# Patient Record
Sex: FEMALE | Race: ASIAN | NOT HISPANIC OR LATINO | URBAN - METROPOLITAN AREA
[De-identification: names, ages, dates, MRNs, and addresses within clinical notes are randomized per-mention and may not be internally consistent; named-entity substitution may affect disease eponyms.]

---

## 2018-04-18 ENCOUNTER — OUTPATIENT (OUTPATIENT)
Dept: OUTPATIENT SERVICES | Facility: HOSPITAL | Age: 68
LOS: 1 days | Discharge: HOME | End: 2018-04-18

## 2018-04-18 RX ORDER — CEPHALEXIN 500 MG
1 CAPSULE ORAL
Qty: 10 | Refills: 0
Start: 2018-04-18 | End: 2018-04-22

## 2018-04-18 RX ORDER — DOCUSATE SODIUM 100 MG
1 CAPSULE ORAL
Qty: 6 | Refills: 0
Start: 2018-04-18 | End: 2018-04-20

## 2019-10-07 ENCOUNTER — APPOINTMENT (OUTPATIENT)
Dept: CARDIOLOGY | Facility: CLINIC | Age: 69
End: 2019-10-07
Payer: MEDICARE

## 2019-10-07 VITALS
DIASTOLIC BLOOD PRESSURE: 70 MMHG | WEIGHT: 137 LBS | HEIGHT: 60 IN | TEMPERATURE: 98 F | SYSTOLIC BLOOD PRESSURE: 123 MMHG | OXYGEN SATURATION: 97 % | RESPIRATION RATE: 18 BRPM | HEART RATE: 78 BPM | BODY MASS INDEX: 26.9 KG/M2

## 2019-10-07 DIAGNOSIS — E78.5 HYPERLIPIDEMIA, UNSPECIFIED: ICD-10-CM

## 2019-10-07 DIAGNOSIS — I10 ESSENTIAL (PRIMARY) HYPERTENSION: ICD-10-CM

## 2019-10-07 PROCEDURE — 99204 OFFICE O/P NEW MOD 45 MIN: CPT

## 2019-10-07 PROCEDURE — 93000 ELECTROCARDIOGRAM COMPLETE: CPT

## 2019-10-07 RX ORDER — LOSARTAN POTASSIUM 100 MG/1
100 TABLET, FILM COATED ORAL
Refills: 0 | Status: ACTIVE | COMMUNITY
Start: 2019-10-07

## 2019-10-07 RX ORDER — SIMVASTATIN 20 MG/1
20 TABLET, FILM COATED ORAL
Refills: 0 | Status: ACTIVE | COMMUNITY
Start: 2019-10-07

## 2019-10-07 NOTE — DISCUSSION/SUMMARY
[FreeTextEntry1] : 68 F HTN hyperlipidemia with CP and SOB.\par Continue medications for HTN.\par Continue statin.\par CHECK ECHOCARDIOGRAM TO ASSESS LV FUNCTION.\par CHECK NST TO ASSESS PERFUSION (limited ET).\par Condition deteriorating, see me after tests.

## 2019-10-07 NOTE — HISTORY OF PRESENT ILLNESS
[FreeTextEntry1] : 1. HTN: on medications.\par 2. Hyperlipidemia: on statin.\par 3. SOB: patient reports severe exertional SOB over several months. SOB is severe, progressive, worsening over some time. She also had syncope one year ago. \par Patient also has had intermittent CP that is midline, substernal, without radiation, lasting minutes without relief. Symptoms are progressively worsening and occurring on a daily basis. \par She denies palpitations or dizziness.

## 2019-10-07 NOTE — PHYSICAL EXAM
[General Appearance - Well Developed] : well developed [Well Groomed] : well groomed [Normal Appearance] : normal appearance [No Deformities] : no deformities [General Appearance - In No Acute Distress] : no acute distress [General Appearance - Well Nourished] : well nourished [Eyelids - No Xanthelasma] : the eyelids demonstrated no xanthelasmas [Normal Conjunctiva] : the conjunctiva exhibited no abnormalities [No Oral Pallor] : no oral pallor [Normal Oral Mucosa] : normal oral mucosa [Normal Jugular Venous V Waves Present] : normal jugular venous V waves present [Normal Jugular Venous A Waves Present] : normal jugular venous A waves present [No Oral Cyanosis] : no oral cyanosis [No Jugular Venous Colunga A Waves] : no jugular venous colunga A waves [Heart Rate And Rhythm] : heart rate and rhythm were normal [Heart Sounds] : normal S1 and S2 [Respiration, Rhythm And Depth] : normal respiratory rhythm and effort [Auscultation Breath Sounds / Voice Sounds] : lungs were clear to auscultation bilaterally [Exaggerated Use Of Accessory Muscles For Inspiration] : no accessory muscle use [Abdomen Soft] : soft [Abdomen Tenderness] : non-tender [Nail Clubbing] : no clubbing of the fingernails [Abdomen Mass (___ Cm)] : no abdominal mass palpated [Petechial Hemorrhages (___cm)] : no petechial hemorrhages [Cyanosis, Localized] : no localized cyanosis [Skin Color & Pigmentation] : normal skin color and pigmentation [] : no rash [No Venous Stasis] : no venous stasis [Skin Lesions] : no skin lesions [No Skin Ulcers] : no skin ulcer [No Xanthoma] : no  xanthoma was observed [Mood] : the mood was normal [No Anxiety] : not feeling anxious [Oriented To Time, Place, And Person] : oriented to person, place, and time [Affect] : the affect was normal [FreeTextEntry1] : 3/6 VARUN JHA

## 2019-10-07 NOTE — REASON FOR VISIT
[Initial Evaluation] : an initial evaluation of [Dyspnea] : dyspnea [Hyperlipidemia] : hyperlipidemia [Hypertension] : hypertension [FreeTextEntry1] : 68 F HTN hyperlipidemia with SOB.

## 2019-11-05 ENCOUNTER — APPOINTMENT (OUTPATIENT)
Dept: CARDIOLOGY | Facility: CLINIC | Age: 69
End: 2019-11-05
Payer: MEDICARE

## 2019-11-05 DIAGNOSIS — R07.9 CHEST PAIN, UNSPECIFIED: ICD-10-CM

## 2019-11-05 PROCEDURE — 78452 HT MUSCLE IMAGE SPECT MULT: CPT

## 2019-11-05 PROCEDURE — 93015 CV STRESS TEST SUPVJ I&R: CPT

## 2019-11-05 PROCEDURE — 93306 TTE W/DOPPLER COMPLETE: CPT

## 2019-11-05 PROCEDURE — A9500: CPT

## 2019-11-14 ENCOUNTER — APPOINTMENT (OUTPATIENT)
Dept: CARDIOLOGY | Facility: CLINIC | Age: 69
End: 2019-11-14
Payer: MEDICARE

## 2019-11-14 ENCOUNTER — NON-APPOINTMENT (OUTPATIENT)
Age: 69
End: 2019-11-14

## 2019-11-14 VITALS
OXYGEN SATURATION: 96 % | TEMPERATURE: 98 F | WEIGHT: 135 LBS | RESPIRATION RATE: 17 BRPM | SYSTOLIC BLOOD PRESSURE: 127 MMHG | DIASTOLIC BLOOD PRESSURE: 74 MMHG | BODY MASS INDEX: 26.37 KG/M2 | HEART RATE: 70 BPM

## 2019-11-14 PROCEDURE — 99214 OFFICE O/P EST MOD 30 MIN: CPT

## 2019-11-14 PROCEDURE — 93000 ELECTROCARDIOGRAM COMPLETE: CPT

## 2019-11-14 RX ORDER — ASPIRIN ENTERIC COATED TABLETS 81 MG 81 MG/1
81 TABLET, DELAYED RELEASE ORAL
Refills: 0 | Status: ACTIVE | COMMUNITY
Start: 2019-11-14

## 2019-11-14 NOTE — DISCUSSION/SUMMARY
[FreeTextEntry1] : 68 F HTN hyperlipidemia with worsening SOB and abnormal NST.\par Continue ASA.\par Continue meds for HTN and hyperlipidemia. REFER FOR CARDIAC CATHETERIZATION.\par Condition deteriorating, see me after tests.\par Care discussed with patient and .

## 2019-11-14 NOTE — REASON FOR VISIT
[Follow-Up - Clinic] : a clinic follow-up of [Dyspnea] : dyspnea [Hyperlipidemia] : hyperlipidemia [Hypertension] : hypertension [FreeTextEntry1] : 68 F HTN hyperlipidemia with SOB. \par

## 2019-11-14 NOTE — HISTORY OF PRESENT ILLNESS
[FreeTextEntry1] : 1. HTN: on medications. No SE noted.\par 2. Hyperlipidemia: on statin. LIpid profile is followed by PMD. \par 3. SOB: patient reports severe exertional SOB over several months. SOB is severe, progressive, worsening over some time. She also had syncope one year ago. \par  Her symsptoms are progressive and worsening. \par She has noted frequent palpitations with dizziness. She had an NST with apical ischemia.

## 2019-11-14 NOTE — PHYSICAL EXAM
[Well Groomed] : well groomed [General Appearance - Well Developed] : well developed [Normal Appearance] : normal appearance [General Appearance - Well Nourished] : well nourished [No Deformities] : no deformities [General Appearance - In No Acute Distress] : no acute distress [Normal Conjunctiva] : the conjunctiva exhibited no abnormalities [Normal Oral Mucosa] : normal oral mucosa [Eyelids - No Xanthelasma] : the eyelids demonstrated no xanthelasmas [No Oral Pallor] : no oral pallor [Normal Jugular Venous A Waves Present] : normal jugular venous A waves present [No Oral Cyanosis] : no oral cyanosis [Normal Jugular Venous V Waves Present] : normal jugular venous V waves present [No Jugular Venous Colunga A Waves] : no jugular venous colunga A waves [Respiration, Rhythm And Depth] : normal respiratory rhythm and effort [Exaggerated Use Of Accessory Muscles For Inspiration] : no accessory muscle use [Auscultation Breath Sounds / Voice Sounds] : lungs were clear to auscultation bilaterally [Heart Sounds] : normal S1 and S2 [Heart Rate And Rhythm] : heart rate and rhythm were normal [FreeTextEntry1] : 3/6 VARUN JHA  [Abdomen Soft] : soft [Abdomen Tenderness] : non-tender [Abdomen Mass (___ Cm)] : no abdominal mass palpated [Nail Clubbing] : no clubbing of the fingernails [Petechial Hemorrhages (___cm)] : no petechial hemorrhages [Cyanosis, Localized] : no localized cyanosis [Skin Color & Pigmentation] : normal skin color and pigmentation [] : no rash [No Venous Stasis] : no venous stasis [No Skin Ulcers] : no skin ulcer [Skin Lesions] : no skin lesions [No Xanthoma] : no  xanthoma was observed [Oriented To Time, Place, And Person] : oriented to person, place, and time [Affect] : the affect was normal [No Anxiety] : not feeling anxious [Mood] : the mood was normal

## 2019-11-20 VITALS
HEART RATE: 65 BPM | SYSTOLIC BLOOD PRESSURE: 150 MMHG | OXYGEN SATURATION: 98 % | DIASTOLIC BLOOD PRESSURE: 70 MMHG | WEIGHT: 134.92 LBS | RESPIRATION RATE: 16 BRPM | HEIGHT: 62 IN | TEMPERATURE: 98 F

## 2019-11-20 RX ORDER — CHLORHEXIDINE GLUCONATE 213 G/1000ML
1 SOLUTION TOPICAL ONCE
Refills: 0 | Status: DISCONTINUED | OUTPATIENT
Start: 2019-11-22 | End: 2019-11-22

## 2019-11-20 NOTE — H&P ADULT - NSHPLABSRESULTS_GEN_ALL_CORE
11.9   4.23  )-----------( 237      ( 22 Nov 2019 11:28 )             37.5   11-22    143  |  108  |  14  ----------------------------<  102<H>  3.8   |  26  |  0.65    Ca    9.0      22 Nov 2019 11:28    TPro  6.8  /  Alb  4.6  /  TBili  0.6  /  DBili  x   /  AST  22  /  ALT  23  /  AlkPhos  67  11-22    PT/INR - ( 22 Nov 2019 11:28 )   PT: 10.9 sec;   INR: 0.97       PTT - ( 22 Nov 2019 11:28 )  PTT:28.7 sec    EKG: SR at 63 BPM. Possible LVH. Left axis deviation. TWI in V2-4, III, and TW flattening in V5 and aVF.

## 2019-11-20 NOTE — H&P ADULT - NSHPROSALLOTHERNEGRD_GEN_ALL_CORE
- coumadin restarted, no GI contraindication
All other review of systems negative, except as noted in HPI
- coumadin restarted, no GI contraindication
Would  DC coumadin given bleed
admitted with GI/vaginal bleed  no plan for IVC filter due to complicated vascular history  after R/B/A discussion, patient also declining continuation of AC at this time
- AC as per primary team, no GI contraindication
- coumadin restarted, no GI contraindication
- no GI contraindication to coumadin however pt is refusing
Consider DC coumadin given bleed
DC coumadin given bleed  Patient agrees abd understands all risks
Off  coumadin given bleed  Patient agrees abd understands all risks
Off  coumadin given bleed  Patient agrees abd understands all risks
On coumadin   For now continue with coumadin as hgb is stable.
On coumadin   For now continue with coumadin as hgb is stable.
Would  DC coumadin given bleed
admitted with GI/vaginal bleed  no plan for IVC filter due to complicated vascular history  after R/B/A discussion, patient also declining continuation of AC at this time
Consider DC coumadin given bleed

## 2019-11-20 NOTE — H&P ADULT - ASSESSMENT
67 yo Indonesian speaking F with PMHx of HTN, HLD, Asthma (no hospitalizations - says this is borderline and that she stopped using inhalers because they don't work) who presents to [facility] for cardiac cath with PCI as indicated in the setting of her risk factors, Class IV anginal equivalent symptoms, and abnormal NST.    ASA Class III  Mallampati Class III    Loaded with Aspirin 325 mg PO x1 and Plavix 600 mg PO x 1.   IV NS started at 75 cc/hr. Euvolemic by exam.   Potassium 3.8, 20 meq of potassium chloride ordered.     Risks & benefits of procedure and alternative therapy have been explained to the patient including but not limited to: allergic reaction, bleeding with possible need for blood transfusion, infection, renal and vascular compromise, limb damage, arrhythmia, stroke, vessel dissection/perforation, Myocardial infarction, emergent CABG. Informed consent obtained and in chart.

## 2019-11-22 ENCOUNTER — OUTPATIENT (OUTPATIENT)
Dept: OUTPATIENT SERVICES | Facility: HOSPITAL | Age: 69
LOS: 1 days | Discharge: NURSING, REHAB OR LONG-TERM CARE | End: 2019-11-22
Payer: MEDICARE

## 2019-11-22 LAB
ALBUMIN SERPL ELPH-MCNC: 4.6 G/DL — SIGNIFICANT CHANGE UP (ref 3.3–5)
ALP SERPL-CCNC: 67 U/L — SIGNIFICANT CHANGE UP (ref 40–120)
ALT FLD-CCNC: 23 U/L — SIGNIFICANT CHANGE UP (ref 10–45)
ANION GAP SERPL CALC-SCNC: 9 MMOL/L — SIGNIFICANT CHANGE UP (ref 5–17)
APTT BLD: 28.7 SEC — SIGNIFICANT CHANGE UP (ref 27.5–36.3)
AST SERPL-CCNC: 22 U/L — SIGNIFICANT CHANGE UP (ref 10–40)
BASOPHILS # BLD AUTO: 0.02 K/UL — SIGNIFICANT CHANGE UP (ref 0–0.2)
BASOPHILS NFR BLD AUTO: 0.5 % — SIGNIFICANT CHANGE UP (ref 0–2)
BILIRUB SERPL-MCNC: 0.6 MG/DL — SIGNIFICANT CHANGE UP (ref 0.2–1.2)
BUN SERPL-MCNC: 14 MG/DL — SIGNIFICANT CHANGE UP (ref 7–23)
CALCIUM SERPL-MCNC: 9 MG/DL — SIGNIFICANT CHANGE UP (ref 8.4–10.5)
CHLORIDE SERPL-SCNC: 108 MMOL/L — SIGNIFICANT CHANGE UP (ref 96–108)
CHOLEST SERPL-MCNC: 192 MG/DL — SIGNIFICANT CHANGE UP (ref 10–199)
CK MB CFR SERPL CALC: 1.5 NG/ML — SIGNIFICANT CHANGE UP (ref 0–6.7)
CK SERPL-CCNC: 95 U/L — SIGNIFICANT CHANGE UP (ref 25–170)
CO2 SERPL-SCNC: 26 MMOL/L — SIGNIFICANT CHANGE UP (ref 22–31)
CREAT SERPL-MCNC: 0.65 MG/DL — SIGNIFICANT CHANGE UP (ref 0.5–1.3)
CRP SERPL-MCNC: 0.13 MG/DL — SIGNIFICANT CHANGE UP (ref 0–0.4)
EOSINOPHIL # BLD AUTO: 0.09 K/UL — SIGNIFICANT CHANGE UP (ref 0–0.5)
EOSINOPHIL NFR BLD AUTO: 2.1 % — SIGNIFICANT CHANGE UP (ref 0–6)
GLUCOSE SERPL-MCNC: 102 MG/DL — HIGH (ref 70–99)
HBA1C BLD-MCNC: 5.4 % — SIGNIFICANT CHANGE UP (ref 4–5.6)
HCT VFR BLD CALC: 37.5 % — SIGNIFICANT CHANGE UP (ref 34.5–45)
HDLC SERPL-MCNC: 67 MG/DL — SIGNIFICANT CHANGE UP
HGB BLD-MCNC: 11.9 G/DL — SIGNIFICANT CHANGE UP (ref 11.5–15.5)
IMM GRANULOCYTES NFR BLD AUTO: 0.2 % — SIGNIFICANT CHANGE UP (ref 0–1.5)
INR BLD: 0.97 — SIGNIFICANT CHANGE UP (ref 0.88–1.16)
LIPID PNL WITH DIRECT LDL SERPL: 105 MG/DL — HIGH
LYMPHOCYTES # BLD AUTO: 1.69 K/UL — SIGNIFICANT CHANGE UP (ref 1–3.3)
LYMPHOCYTES # BLD AUTO: 40 % — SIGNIFICANT CHANGE UP (ref 13–44)
MCHC RBC-ENTMCNC: 30.5 PG — SIGNIFICANT CHANGE UP (ref 27–34)
MCHC RBC-ENTMCNC: 31.7 GM/DL — LOW (ref 32–36)
MCV RBC AUTO: 96.2 FL — SIGNIFICANT CHANGE UP (ref 80–100)
MONOCYTES # BLD AUTO: 0.42 K/UL — SIGNIFICANT CHANGE UP (ref 0–0.9)
MONOCYTES NFR BLD AUTO: 9.9 % — SIGNIFICANT CHANGE UP (ref 2–14)
NEUTROPHILS # BLD AUTO: 2 K/UL — SIGNIFICANT CHANGE UP (ref 1.8–7.4)
NEUTROPHILS NFR BLD AUTO: 47.3 % — SIGNIFICANT CHANGE UP (ref 43–77)
NRBC # BLD: 0 /100 WBCS — SIGNIFICANT CHANGE UP (ref 0–0)
PLATELET # BLD AUTO: 237 K/UL — SIGNIFICANT CHANGE UP (ref 150–400)
POTASSIUM SERPL-MCNC: 3.8 MMOL/L — SIGNIFICANT CHANGE UP (ref 3.5–5.3)
POTASSIUM SERPL-SCNC: 3.8 MMOL/L — SIGNIFICANT CHANGE UP (ref 3.5–5.3)
PROT SERPL-MCNC: 6.8 G/DL — SIGNIFICANT CHANGE UP (ref 6–8.3)
PROTHROM AB SERPL-ACNC: 10.9 SEC — SIGNIFICANT CHANGE UP (ref 10–12.9)
RBC # BLD: 3.9 M/UL — SIGNIFICANT CHANGE UP (ref 3.8–5.2)
RBC # FLD: 12.3 % — SIGNIFICANT CHANGE UP (ref 10.3–14.5)
SODIUM SERPL-SCNC: 143 MMOL/L — SIGNIFICANT CHANGE UP (ref 135–145)
TOTAL CHOLESTEROL/HDL RATIO MEASUREMENT: 2.9 RATIO — LOW (ref 3.3–7.1)
TRIGL SERPL-MCNC: 99 MG/DL — SIGNIFICANT CHANGE UP (ref 10–149)
WBC # BLD: 4.23 K/UL — SIGNIFICANT CHANGE UP (ref 3.8–10.5)
WBC # FLD AUTO: 4.23 K/UL — SIGNIFICANT CHANGE UP (ref 3.8–10.5)

## 2019-11-22 PROCEDURE — 80061 LIPID PANEL: CPT

## 2019-11-22 PROCEDURE — 85730 THROMBOPLASTIN TIME PARTIAL: CPT

## 2019-11-22 PROCEDURE — 80053 COMPREHEN METABOLIC PANEL: CPT

## 2019-11-22 PROCEDURE — 82553 CREATINE MB FRACTION: CPT

## 2019-11-22 PROCEDURE — 83036 HEMOGLOBIN GLYCOSYLATED A1C: CPT

## 2019-11-22 PROCEDURE — 86140 C-REACTIVE PROTEIN: CPT

## 2019-11-22 PROCEDURE — 85025 COMPLETE CBC W/AUTO DIFF WBC: CPT

## 2019-11-22 PROCEDURE — 85610 PROTHROMBIN TIME: CPT

## 2019-11-22 PROCEDURE — C1894: CPT

## 2019-11-22 PROCEDURE — 93458 L HRT ARTERY/VENTRICLE ANGIO: CPT | Mod: 26

## 2019-11-22 PROCEDURE — 93005 ELECTROCARDIOGRAM TRACING: CPT

## 2019-11-22 PROCEDURE — C1769: CPT

## 2019-11-22 PROCEDURE — 93458 L HRT ARTERY/VENTRICLE ANGIO: CPT

## 2019-11-22 PROCEDURE — C1887: CPT

## 2019-11-22 PROCEDURE — 82550 ASSAY OF CK (CPK): CPT

## 2019-11-22 PROCEDURE — 99234 HOSP IP/OBS SM DT SF/LOW 45: CPT

## 2019-11-22 PROCEDURE — 93010 ELECTROCARDIOGRAM REPORT: CPT

## 2019-11-22 RX ORDER — ASPIRIN/CALCIUM CARB/MAGNESIUM 324 MG
325 TABLET ORAL ONCE
Refills: 0 | Status: COMPLETED | OUTPATIENT
Start: 2019-11-22 | End: 2019-11-22

## 2019-11-22 RX ORDER — POTASSIUM CHLORIDE 20 MEQ
20 PACKET (EA) ORAL ONCE
Refills: 0 | Status: COMPLETED | OUTPATIENT
Start: 2019-11-22 | End: 2019-11-22

## 2019-11-22 RX ORDER — SODIUM CHLORIDE 9 MG/ML
500 INJECTION INTRAMUSCULAR; INTRAVENOUS; SUBCUTANEOUS
Refills: 0 | Status: DISCONTINUED | OUTPATIENT
Start: 2019-11-22 | End: 2019-11-22

## 2019-11-22 RX ORDER — CLOPIDOGREL BISULFATE 75 MG/1
600 TABLET, FILM COATED ORAL ONCE
Refills: 0 | Status: COMPLETED | OUTPATIENT
Start: 2019-11-22 | End: 2019-11-22

## 2019-11-22 RX ADMIN — Medication 325 MILLIGRAM(S): at 13:12

## 2019-11-22 RX ADMIN — CLOPIDOGREL BISULFATE 600 MILLIGRAM(S): 75 TABLET, FILM COATED ORAL at 13:12

## 2019-11-22 RX ADMIN — SODIUM CHLORIDE 75 MILLILITER(S): 9 INJECTION INTRAMUSCULAR; INTRAVENOUS; SUBCUTANEOUS at 13:12

## 2019-11-22 RX ADMIN — Medication 20 MILLIEQUIVALENT(S): at 13:12

## 2019-11-22 NOTE — PROGRESS NOTE ADULT - SUBJECTIVE AND OBJECTIVE BOX
Interventional Cardiology PA SDA Discharge Note    Patient without complaints. Ambulated and voided without difficulties    Afebrile, VSS    Ext:    		Right   Radial :  no hematoma, no  bleeding, dressing; C/D/I      Pulses:    intact RAD to baseline     A/P:  70 yo Spanish speaking F with PMHx of HTN, HLD, Asthma (no hospitalizations - says this is borderline and that she stopped using inhalers because they don't work) presented to her Cardiologist with c/o constant SOB which worsens with any movement and is better with rest. Pt also claims that she had syncopal episode one year ago. She has noted to have frequent palpitations with accompanied dizziness. Pt denies CP, PND/orthopnea, LE edema, n/v, fever/chills, blood in the stool/melena. NST 11/5/19: normal LV with EF >70%, small mild defects in distal inferior and inferoapical walls that are reversible suggestive of mild RCA/LCx ischemia. ECHO 11/5/19: mild concentric LVH, EF  60-65%, no segmental WMA, normal RV, minimal TR. In light of pt's risk factors, Class IV anginal equivalent symptoms, and abnormal NST, pt is now referred to [facility] for recommended cardiac cath with possible intervention.      Pt now s/p diagnostic cardiac cath on 11/22/19 showing normal coronaries. EF 60%. No AS, no MR. Right radial access        1.	Stable for discharge as per attending Dr. Rutledge after bed rest, pt voids, groin/wrist stable and 30 minutes of ambulation.  2.	Follow-up with PMD/Cardiologist Dr Lacey in 1-2 weeks  3.	Discharged forms signed and copies in chart

## 2019-12-19 ENCOUNTER — APPOINTMENT (OUTPATIENT)
Dept: CARDIOLOGY | Facility: CLINIC | Age: 69
End: 2019-12-19
Payer: MEDICARE

## 2019-12-19 VITALS
HEART RATE: 73 BPM | SYSTOLIC BLOOD PRESSURE: 153 MMHG | WEIGHT: 139 LBS | TEMPERATURE: 97.7 F | OXYGEN SATURATION: 97 % | RESPIRATION RATE: 18 BRPM | DIASTOLIC BLOOD PRESSURE: 77 MMHG | BODY MASS INDEX: 27.15 KG/M2

## 2019-12-19 PROCEDURE — 99214 OFFICE O/P EST MOD 30 MIN: CPT

## 2019-12-19 PROCEDURE — 93000 ELECTROCARDIOGRAM COMPLETE: CPT

## 2019-12-19 RX ORDER — FUROSEMIDE 20 MG/1
20 TABLET ORAL
Qty: 15 | Refills: 2 | Status: ACTIVE | COMMUNITY
Start: 2019-12-19 | End: 1900-01-01

## 2019-12-19 NOTE — DISCUSSION/SUMMARY
[FreeTextEntry1] : 69 F HTN hyperlipidemia with SOB. \par Start lasix 20 2 times/ week.\par Continue meds for HTN.\par Continue statin.

## 2019-12-19 NOTE — PHYSICAL EXAM
[General Appearance - Well Developed] : well developed [Well Groomed] : well groomed [Normal Appearance] : normal appearance [No Deformities] : no deformities [General Appearance - Well Nourished] : well nourished [General Appearance - In No Acute Distress] : no acute distress [Normal Conjunctiva] : the conjunctiva exhibited no abnormalities [Eyelids - No Xanthelasma] : the eyelids demonstrated no xanthelasmas [No Oral Pallor] : no oral pallor [Normal Oral Mucosa] : normal oral mucosa [No Oral Cyanosis] : no oral cyanosis [Normal Jugular Venous A Waves Present] : normal jugular venous A waves present [Normal Jugular Venous V Waves Present] : normal jugular venous V waves present [No Jugular Venous Colunga A Waves] : no jugular venous colunga A waves [Respiration, Rhythm And Depth] : normal respiratory rhythm and effort [Auscultation Breath Sounds / Voice Sounds] : lungs were clear to auscultation bilaterally [Exaggerated Use Of Accessory Muscles For Inspiration] : no accessory muscle use [Heart Rate And Rhythm] : heart rate and rhythm were normal [Heart Sounds] : normal S1 and S2 [Abdomen Soft] : soft [Abdomen Tenderness] : non-tender [Abdomen Mass (___ Cm)] : no abdominal mass palpated [Nail Clubbing] : no clubbing of the fingernails [Cyanosis, Localized] : no localized cyanosis [Petechial Hemorrhages (___cm)] : no petechial hemorrhages [Skin Color & Pigmentation] : normal skin color and pigmentation [] : no rash [No Venous Stasis] : no venous stasis [Skin Lesions] : no skin lesions [No Skin Ulcers] : no skin ulcer [No Xanthoma] : no  xanthoma was observed [Oriented To Time, Place, And Person] : oriented to person, place, and time [Affect] : the affect was normal [Mood] : the mood was normal [No Anxiety] : not feeling anxious [FreeTextEntry1] : 3/6 VARUN JHA

## 2019-12-19 NOTE — REASON FOR VISIT
[Follow-Up - Clinic] : a clinic follow-up of [Hyperlipidemia] : hyperlipidemia [Hypertension] : hypertension [FreeTextEntry1] : 69 F HTN hyperlipidemia with SOB.

## 2019-12-19 NOTE — HISTORY OF PRESENT ILLNESS
[FreeTextEntry1] : 1. HTN: on medications. Controlled. \par 2. Hyperlipidemia: on statin. No muscle pain is noted.\par 3. SOB: patient underwent cardiac cath which showed mild CAD. Patient still has SOB. \par Pulm consult revealed no obstructive disease.\par

## 2020-01-27 ENCOUNTER — APPOINTMENT (OUTPATIENT)
Dept: CARDIOLOGY | Facility: CLINIC | Age: 70
End: 2020-01-27
Payer: MEDICARE

## 2020-01-27 VITALS
BODY MASS INDEX: 26.95 KG/M2 | TEMPERATURE: 98.2 F | RESPIRATION RATE: 18 BRPM | SYSTOLIC BLOOD PRESSURE: 123 MMHG | DIASTOLIC BLOOD PRESSURE: 73 MMHG | OXYGEN SATURATION: 98 % | HEART RATE: 74 BPM | WEIGHT: 138 LBS

## 2020-01-27 DIAGNOSIS — I25.10 ATHEROSCLEROTIC HEART DISEASE OF NATIVE CORONARY ARTERY W/OUT ANGINA PECTORIS: ICD-10-CM

## 2020-01-27 PROCEDURE — 93000 ELECTROCARDIOGRAM COMPLETE: CPT

## 2020-01-27 PROCEDURE — 99214 OFFICE O/P EST MOD 30 MIN: CPT

## 2020-01-27 NOTE — DISCUSSION/SUMMARY
[FreeTextEntry1] : 69 F HTN hyperlipidemia CAD with SOB.\par REFER TO PULM.\par Continue medications for HTN and hyperlipidemia.\par Continue ASA for CAD.

## 2020-01-27 NOTE — REASON FOR VISIT
[Follow-Up - Clinic] : a clinic follow-up of [Coronary Artery Disease] : coronary artery disease [Hyperlipidemia] : hyperlipidemia [Hypertension] : hypertension [FreeTextEntry1] : 69 F HTN hyperlipidemia CAD with cough.\par [phone]\par Dr. Nando Lares

## 2020-01-27 NOTE — HISTORY OF PRESENT ILLNESS
[FreeTextEntry1] : 1. HTN: on medications. Controlled. No SE noted. \par 2. Hyperlipidemia: on statin. No muscl epain. Compliant with medications.\par 3. CAD: patient underwent cardiac cath which showed mild CAD. \par She denies CP but has had intermittent SOB and cough for over one year. The patient has noted chronic SOB with wheezing. Her ET is limited by SOB.

## 2020-01-27 NOTE — PHYSICAL EXAM
[General Appearance - Well Developed] : well developed [Normal Appearance] : normal appearance [Well Groomed] : well groomed [General Appearance - Well Nourished] : well nourished [No Deformities] : no deformities [General Appearance - In No Acute Distress] : no acute distress [Normal Conjunctiva] : the conjunctiva exhibited no abnormalities [Eyelids - No Xanthelasma] : the eyelids demonstrated no xanthelasmas [Normal Oral Mucosa] : normal oral mucosa [No Oral Pallor] : no oral pallor [No Oral Cyanosis] : no oral cyanosis [Normal Jugular Venous A Waves Present] : normal jugular venous A waves present [Normal Jugular Venous V Waves Present] : normal jugular venous V waves present [No Jugular Venous Colunga A Waves] : no jugular venous colunga A waves [Respiration, Rhythm And Depth] : normal respiratory rhythm and effort [Exaggerated Use Of Accessory Muscles For Inspiration] : no accessory muscle use [Auscultation Breath Sounds / Voice Sounds] : lungs were clear to auscultation bilaterally [Heart Rate And Rhythm] : heart rate and rhythm were normal [Heart Sounds] : normal S1 and S2 [Abdomen Soft] : soft [Abdomen Tenderness] : non-tender [Abdomen Mass (___ Cm)] : no abdominal mass palpated [Nail Clubbing] : no clubbing of the fingernails [Cyanosis, Localized] : no localized cyanosis [Petechial Hemorrhages (___cm)] : no petechial hemorrhages [Skin Color & Pigmentation] : normal skin color and pigmentation [] : no rash [No Venous Stasis] : no venous stasis [Skin Lesions] : no skin lesions [No Skin Ulcers] : no skin ulcer [No Xanthoma] : no  xanthoma was observed [Oriented To Time, Place, And Person] : oriented to person, place, and time [Affect] : the affect was normal [Mood] : the mood was normal [No Anxiety] : not feeling anxious [FreeTextEntry1] : 3/6 VARUN JHA

## 2020-03-16 ENCOUNTER — APPOINTMENT (OUTPATIENT)
Dept: CARDIOLOGY | Facility: CLINIC | Age: 70
End: 2020-03-16

## 2020-08-10 ENCOUNTER — APPOINTMENT (OUTPATIENT)
Dept: HEART AND VASCULAR | Facility: CLINIC | Age: 70
End: 2020-08-10
Payer: MEDICARE

## 2020-08-10 PROCEDURE — 99442: CPT

## 2020-08-10 NOTE — PLAN
[FreeTextEntry1] : 1. Continue medications for HTN.\par 2. Continue statin.\par 3. Symptom monitoring and social distancing.

## 2020-08-10 NOTE — HISTORY OF PRESENT ILLNESS
[Home] : at home, [unfilled] , at the time of the visit. [Medical Office: ([facility])___] : at the medical office located in  [Verbal consent obtained from patient] : the patient, [unfilled] [FreeTextEntry1] : 69 F HTN hyperlipidemia CAD with cough.\par [phone]\par Dr. Nando Lares \par  \par 1. HTN: on medications. Compliant with medications.\par 2. Hyperlipidemia: on statin. No SE noted. .\par 3. CAD: patient underwent cardiac cath which showed mild CAD. \par Patient denies CP or SOB. No cough or fevers noted. \par  \par \par EK20 NSR nl axis no ST changes  \par Echo: 2019 normal LVEF  \par Cardiac Cath: 2019 mild CAD  \par  [Time Spent: ___ minutes] : I have spent [unfilled] minutes with the patient on the telephone

## 2020-09-17 ENCOUNTER — APPOINTMENT (OUTPATIENT)
Dept: HEART AND VASCULAR | Facility: CLINIC | Age: 70
End: 2020-09-17
Payer: MEDICARE

## 2020-09-17 PROCEDURE — 99442: CPT

## 2020-09-17 NOTE — HISTORY OF PRESENT ILLNESS
[Home] : at home, [unfilled] , at the time of the visit. [Medical Office: ([facility])___] : at the medical office located in  [Verbal consent obtained from patient] : the patient, [unfilled] [FreeTextEntry1] : 69 F HTN hyperlipidemia CAD with cough.\par [phone]\par Dr. Nando Lares \par  \par 1. HTN: on medications. Compliant with medications.\par 2. Hyperlipidemia: on statin. No SE noted. \par 3. CAD: patient underwent cardiac cath which showed mild CAD. \par Patient denies CP or SOB. No cough or fevers noted.    [Time Spent: ___ minutes] : I have spent [unfilled] minutes with the patient on the telephone

## 2020-10-08 ENCOUNTER — NON-APPOINTMENT (OUTPATIENT)
Age: 70
End: 2020-10-08

## 2020-10-08 ENCOUNTER — APPOINTMENT (OUTPATIENT)
Dept: CARDIOLOGY | Facility: CLINIC | Age: 70
End: 2020-10-08
Payer: MEDICARE

## 2020-10-08 VITALS
RESPIRATION RATE: 18 BRPM | TEMPERATURE: 97.7 F | WEIGHT: 138 LBS | BODY MASS INDEX: 26.95 KG/M2 | DIASTOLIC BLOOD PRESSURE: 74 MMHG | SYSTOLIC BLOOD PRESSURE: 116 MMHG | HEART RATE: 65 BPM | OXYGEN SATURATION: 94 %

## 2020-10-08 PROCEDURE — 99214 OFFICE O/P EST MOD 30 MIN: CPT

## 2020-10-08 PROCEDURE — 93000 ELECTROCARDIOGRAM COMPLETE: CPT

## 2020-10-08 NOTE — HISTORY OF PRESENT ILLNESS
[FreeTextEntry1] :  \par 1. HTN: on medications. Compliant with medications. \par 2. Hyperlipidemia: on statin.  \par 3. CAD: patient underwent cardiac cath which showed mild CAD. \par She has chronic exertional SOB. Patient denies CP or palpitations. \par  \par Cardiology Summary\par \par EK20 NSR nl axis no ST changes  \par Echo: 2019 normal LVEF  \par Cardiac Cath: 2019 mild CAD  \par

## 2020-10-08 NOTE — PHYSICAL EXAM
[General Appearance - Well Developed] : well developed [Normal Appearance] : normal appearance [Well Groomed] : well groomed [General Appearance - Well Nourished] : well nourished [No Deformities] : no deformities [General Appearance - In No Acute Distress] : no acute distress [Normal Conjunctiva] : the conjunctiva exhibited no abnormalities [Eyelids - No Xanthelasma] : the eyelids demonstrated no xanthelasmas [Normal Oral Mucosa] : normal oral mucosa [No Oral Pallor] : no oral pallor [No Oral Cyanosis] : no oral cyanosis [Normal Jugular Venous A Waves Present] : normal jugular venous A waves present [Normal Jugular Venous V Waves Present] : normal jugular venous V waves present [No Jugular Venous Colunga A Waves] : no jugular venous colunga A waves [Respiration, Rhythm And Depth] : normal respiratory rhythm and effort [Exaggerated Use Of Accessory Muscles For Inspiration] : no accessory muscle use [Auscultation Breath Sounds / Voice Sounds] : lungs were clear to auscultation bilaterally [Heart Rate And Rhythm] : heart rate and rhythm were normal [Heart Sounds] : normal S1 and S2 [FreeTextEntry1] : 3/6 VARUN JHA  [Abdomen Soft] : soft [Abdomen Tenderness] : non-tender [Abdomen Mass (___ Cm)] : no abdominal mass palpated [Nail Clubbing] : no clubbing of the fingernails [Cyanosis, Localized] : no localized cyanosis [Petechial Hemorrhages (___cm)] : no petechial hemorrhages [Skin Color & Pigmentation] : normal skin color and pigmentation [] : no rash [No Venous Stasis] : no venous stasis [Skin Lesions] : no skin lesions [No Skin Ulcers] : no skin ulcer [No Xanthoma] : no  xanthoma was observed [Oriented To Time, Place, And Person] : oriented to person, place, and time [Affect] : the affect was normal [Mood] : the mood was normal [No Anxiety] : not feeling anxious

## 2020-10-08 NOTE — REASON FOR VISIT
[Follow-Up - Clinic] : a clinic follow-up of [Hyperlipidemia] : hyperlipidemia [Hypertension] : hypertension [FreeTextEntry1] : 69 F HTN hyperlipidemia CAD with cough.\par [phone]\par Dr. Nando Lares \par

## 2020-10-08 NOTE — DISCUSSION/SUMMARY
[FreeTextEntry1] : 69 F HTN hyperlipidemia CAD for f/u.\par Continue medications for HTN.\par Continue statin for hyperlipidemia. \par Continue ASA for CAD. \par Refer to Pulm for SOB.

## 2020-12-03 ENCOUNTER — APPOINTMENT (OUTPATIENT)
Dept: CARDIOLOGY | Facility: CLINIC | Age: 70
End: 2020-12-03

## 2022-06-23 ENCOUNTER — INPATIENT (INPATIENT)
Facility: HOSPITAL | Age: 72
LOS: 1 days | Discharge: HOME | DRG: 149 | End: 2022-06-25
Attending: INTERNAL MEDICINE | Admitting: INTERNAL MEDICINE
Payer: MEDICARE

## 2022-06-23 VITALS
TEMPERATURE: 98 F | HEIGHT: 62 IN | DIASTOLIC BLOOD PRESSURE: 78 MMHG | OXYGEN SATURATION: 97 % | SYSTOLIC BLOOD PRESSURE: 129 MMHG | HEART RATE: 97 BPM | RESPIRATION RATE: 16 BRPM | WEIGHT: 130.07 LBS

## 2022-06-23 DIAGNOSIS — I25.10 ATHEROSCLEROTIC HEART DISEASE OF NATIVE CORONARY ARTERY WITHOUT ANGINA PECTORIS: ICD-10-CM

## 2022-06-23 DIAGNOSIS — R42 DIZZINESS AND GIDDINESS: ICD-10-CM

## 2022-06-23 DIAGNOSIS — J45.909 UNSPECIFIED ASTHMA, UNCOMPLICATED: ICD-10-CM

## 2022-06-23 DIAGNOSIS — E78.5 HYPERLIPIDEMIA, UNSPECIFIED: ICD-10-CM

## 2022-06-23 DIAGNOSIS — I10 ESSENTIAL (PRIMARY) HYPERTENSION: ICD-10-CM

## 2022-06-23 DIAGNOSIS — Z29.9 ENCOUNTER FOR PROPHYLACTIC MEASURES, UNSPECIFIED: ICD-10-CM

## 2022-06-23 DIAGNOSIS — Z98.890 OTHER SPECIFIED POSTPROCEDURAL STATES: Chronic | ICD-10-CM

## 2022-06-23 LAB
ALBUMIN SERPL ELPH-MCNC: 3.7 G/DL — SIGNIFICANT CHANGE UP (ref 3.3–5)
ALBUMIN SERPL ELPH-MCNC: 4 G/DL — SIGNIFICANT CHANGE UP (ref 3.3–5)
ALP SERPL-CCNC: 62 U/L — SIGNIFICANT CHANGE UP (ref 40–120)
ALP SERPL-CCNC: 72 U/L — SIGNIFICANT CHANGE UP (ref 40–120)
ALT FLD-CCNC: 30 U/L — SIGNIFICANT CHANGE UP (ref 12–78)
ALT FLD-CCNC: 34 U/L — SIGNIFICANT CHANGE UP (ref 12–78)
ANION GAP SERPL CALC-SCNC: 8 MMOL/L — SIGNIFICANT CHANGE UP (ref 5–17)
ANION GAP SERPL CALC-SCNC: 9 MMOL/L — SIGNIFICANT CHANGE UP (ref 5–17)
APTT BLD: 27.6 SEC — SIGNIFICANT CHANGE UP (ref 27.5–35.5)
AST SERPL-CCNC: 17 U/L — SIGNIFICANT CHANGE UP (ref 15–37)
AST SERPL-CCNC: 24 U/L — SIGNIFICANT CHANGE UP (ref 15–37)
BASOPHILS # BLD AUTO: 0.01 K/UL — SIGNIFICANT CHANGE UP (ref 0–0.2)
BASOPHILS NFR BLD AUTO: 0.1 % — SIGNIFICANT CHANGE UP (ref 0–2)
BILIRUB SERPL-MCNC: 0.5 MG/DL — SIGNIFICANT CHANGE UP (ref 0.2–1.2)
BILIRUB SERPL-MCNC: 0.6 MG/DL — SIGNIFICANT CHANGE UP (ref 0.2–1.2)
BUN SERPL-MCNC: 24 MG/DL — HIGH (ref 7–23)
BUN SERPL-MCNC: 31 MG/DL — HIGH (ref 7–23)
CALCIUM SERPL-MCNC: 8.9 MG/DL — SIGNIFICANT CHANGE UP (ref 8.5–10.1)
CALCIUM SERPL-MCNC: 9.2 MG/DL — SIGNIFICANT CHANGE UP (ref 8.5–10.1)
CHLORIDE SERPL-SCNC: 110 MMOL/L — HIGH (ref 96–108)
CHLORIDE SERPL-SCNC: 110 MMOL/L — HIGH (ref 96–108)
CO2 SERPL-SCNC: 20 MMOL/L — LOW (ref 22–31)
CO2 SERPL-SCNC: 25 MMOL/L — SIGNIFICANT CHANGE UP (ref 22–31)
CREAT SERPL-MCNC: 0.86 MG/DL — SIGNIFICANT CHANGE UP (ref 0.5–1.3)
CREAT SERPL-MCNC: 1.1 MG/DL — SIGNIFICANT CHANGE UP (ref 0.5–1.3)
EGFR: 53 ML/MIN/1.73M2 — LOW
EGFR: 71 ML/MIN/1.73M2 — SIGNIFICANT CHANGE UP
EOSINOPHIL # BLD AUTO: 0 K/UL — SIGNIFICANT CHANGE UP (ref 0–0.5)
EOSINOPHIL NFR BLD AUTO: 0 % — SIGNIFICANT CHANGE UP (ref 0–6)
GLUCOSE SERPL-MCNC: 135 MG/DL — HIGH (ref 70–99)
GLUCOSE SERPL-MCNC: 145 MG/DL — HIGH (ref 70–99)
HCT VFR BLD CALC: 40.1 % — SIGNIFICANT CHANGE UP (ref 34.5–45)
HGB BLD-MCNC: 13.2 G/DL — SIGNIFICANT CHANGE UP (ref 11.5–15.5)
IMM GRANULOCYTES NFR BLD AUTO: 0.5 % — SIGNIFICANT CHANGE UP (ref 0–1.5)
INR BLD: 1 RATIO — SIGNIFICANT CHANGE UP (ref 0.88–1.16)
LYMPHOCYTES # BLD AUTO: 1.23 K/UL — SIGNIFICANT CHANGE UP (ref 1–3.3)
LYMPHOCYTES # BLD AUTO: 11.2 % — LOW (ref 13–44)
MCHC RBC-ENTMCNC: 31 PG — SIGNIFICANT CHANGE UP (ref 27–34)
MCHC RBC-ENTMCNC: 32.9 GM/DL — SIGNIFICANT CHANGE UP (ref 32–36)
MCV RBC AUTO: 94.1 FL — SIGNIFICANT CHANGE UP (ref 80–100)
MONOCYTES # BLD AUTO: 0.48 K/UL — SIGNIFICANT CHANGE UP (ref 0–0.9)
MONOCYTES NFR BLD AUTO: 4.4 % — SIGNIFICANT CHANGE UP (ref 2–14)
NEUTROPHILS # BLD AUTO: 9.23 K/UL — HIGH (ref 1.8–7.4)
NEUTROPHILS NFR BLD AUTO: 83.8 % — HIGH (ref 43–77)
NRBC # BLD: 0 /100 WBCS — SIGNIFICANT CHANGE UP (ref 0–0)
PLATELET # BLD AUTO: 358 K/UL — SIGNIFICANT CHANGE UP (ref 150–400)
POTASSIUM SERPL-MCNC: 3.6 MMOL/L — SIGNIFICANT CHANGE UP (ref 3.5–5.3)
POTASSIUM SERPL-MCNC: 4 MMOL/L — SIGNIFICANT CHANGE UP (ref 3.5–5.3)
POTASSIUM SERPL-SCNC: 3.6 MMOL/L — SIGNIFICANT CHANGE UP (ref 3.5–5.3)
POTASSIUM SERPL-SCNC: 4 MMOL/L — SIGNIFICANT CHANGE UP (ref 3.5–5.3)
PROT SERPL-MCNC: 7.1 G/DL — SIGNIFICANT CHANGE UP (ref 6–8.3)
PROT SERPL-MCNC: 7.8 G/DL — SIGNIFICANT CHANGE UP (ref 6–8.3)
PROTHROM AB SERPL-ACNC: 11.7 SEC — SIGNIFICANT CHANGE UP (ref 10.5–13.4)
RBC # BLD: 4.26 M/UL — SIGNIFICANT CHANGE UP (ref 3.8–5.2)
RBC # FLD: 11.4 % — SIGNIFICANT CHANGE UP (ref 10.3–14.5)
SARS-COV-2 RNA SPEC QL NAA+PROBE: SIGNIFICANT CHANGE UP
SODIUM SERPL-SCNC: 139 MMOL/L — SIGNIFICANT CHANGE UP (ref 135–145)
SODIUM SERPL-SCNC: 143 MMOL/L — SIGNIFICANT CHANGE UP (ref 135–145)
TROPONIN I, HIGH SENSITIVITY RESULT: 9.3 NG/L — SIGNIFICANT CHANGE UP
WBC # BLD: 11 K/UL — HIGH (ref 3.8–10.5)
WBC # FLD AUTO: 11 K/UL — HIGH (ref 3.8–10.5)

## 2022-06-23 PROCEDURE — 70544 MR ANGIOGRAPHY HEAD W/O DYE: CPT | Mod: 26,59

## 2022-06-23 PROCEDURE — 70450 CT HEAD/BRAIN W/O DYE: CPT | Mod: 26,MA

## 2022-06-23 PROCEDURE — 93880 EXTRACRANIAL BILAT STUDY: CPT | Mod: 26

## 2022-06-23 PROCEDURE — 99223 1ST HOSP IP/OBS HIGH 75: CPT | Mod: GC

## 2022-06-23 PROCEDURE — 70551 MRI BRAIN STEM W/O DYE: CPT | Mod: 26

## 2022-06-23 PROCEDURE — 99285 EMERGENCY DEPT VISIT HI MDM: CPT

## 2022-06-23 PROCEDURE — 93010 ELECTROCARDIOGRAM REPORT: CPT

## 2022-06-23 RX ORDER — ATORVASTATIN CALCIUM 80 MG/1
20 TABLET, FILM COATED ORAL AT BEDTIME
Refills: 0 | Status: DISCONTINUED | OUTPATIENT
Start: 2022-06-23 | End: 2022-06-25

## 2022-06-23 RX ORDER — OXYBUTYNIN CHLORIDE 5 MG
1 TABLET ORAL
Qty: 0 | Refills: 0 | DISCHARGE

## 2022-06-23 RX ORDER — SENNA PLUS 8.6 MG/1
2 TABLET ORAL
Qty: 0 | Refills: 0 | DISCHARGE

## 2022-06-23 RX ORDER — TRAMADOL HYDROCHLORIDE 50 MG/1
50 TABLET ORAL EVERY 8 HOURS
Refills: 0 | Status: DISCONTINUED | OUTPATIENT
Start: 2022-06-23 | End: 2022-06-25

## 2022-06-23 RX ORDER — SIMVASTATIN 20 MG/1
1 TABLET, FILM COATED ORAL
Qty: 0 | Refills: 0 | DISCHARGE

## 2022-06-23 RX ORDER — MONTELUKAST 4 MG/1
10 TABLET, CHEWABLE ORAL DAILY
Refills: 0 | Status: DISCONTINUED | OUTPATIENT
Start: 2022-06-23 | End: 2022-06-25

## 2022-06-23 RX ORDER — LOSARTAN POTASSIUM 100 MG/1
1 TABLET, FILM COATED ORAL
Qty: 0 | Refills: 0 | DISCHARGE

## 2022-06-23 RX ORDER — ASPIRIN/CALCIUM CARB/MAGNESIUM 324 MG
324 TABLET ORAL ONCE
Refills: 0 | Status: COMPLETED | OUTPATIENT
Start: 2022-06-23 | End: 2022-06-23

## 2022-06-23 RX ORDER — GABAPENTIN 400 MG/1
200 CAPSULE ORAL EVERY 8 HOURS
Refills: 0 | Status: DISCONTINUED | OUTPATIENT
Start: 2022-06-23 | End: 2022-06-25

## 2022-06-23 RX ORDER — OMEGA-3 ACID ETHYL ESTERS 1 G
1 CAPSULE ORAL
Qty: 0 | Refills: 0 | DISCHARGE

## 2022-06-23 RX ORDER — ASPIRIN/CALCIUM CARB/MAGNESIUM 324 MG
81 TABLET ORAL DAILY
Refills: 0 | Status: DISCONTINUED | OUTPATIENT
Start: 2022-06-24 | End: 2022-06-25

## 2022-06-23 RX ORDER — OXYBUTYNIN CHLORIDE 5 MG
5 TABLET ORAL DAILY
Refills: 0 | Status: DISCONTINUED | OUTPATIENT
Start: 2022-06-23 | End: 2022-06-25

## 2022-06-23 RX ORDER — ASCORBIC ACID 60 MG
1 TABLET,CHEWABLE ORAL
Qty: 0 | Refills: 0 | DISCHARGE

## 2022-06-23 RX ORDER — ATORVASTATIN CALCIUM 80 MG/1
80 TABLET, FILM COATED ORAL AT BEDTIME
Refills: 0 | Status: DISCONTINUED | OUTPATIENT
Start: 2022-06-23 | End: 2022-06-23

## 2022-06-23 RX ORDER — MECLIZINE HCL 12.5 MG
25 TABLET ORAL ONCE
Refills: 0 | Status: COMPLETED | OUTPATIENT
Start: 2022-06-23 | End: 2022-06-23

## 2022-06-23 RX ORDER — FLUTICASONE FUROATE AND VILANTEROL TRIFENATATE 100; 25 UG/1; UG/1
1 POWDER RESPIRATORY (INHALATION)
Qty: 0 | Refills: 0 | DISCHARGE

## 2022-06-23 RX ORDER — ACETAMINOPHEN 500 MG
650 TABLET ORAL EVERY 6 HOURS
Refills: 0 | Status: DISCONTINUED | OUTPATIENT
Start: 2022-06-23 | End: 2022-06-25

## 2022-06-23 RX ORDER — DICLOFENAC SODIUM 30 MG/G
1 GEL TOPICAL
Qty: 0 | Refills: 0 | DISCHARGE

## 2022-06-23 RX ORDER — OMEGA-3 ACID ETHYL ESTERS 1 G
1 CAPSULE ORAL
Refills: 0 | Status: DISCONTINUED | OUTPATIENT
Start: 2022-06-23 | End: 2022-06-25

## 2022-06-23 RX ORDER — MONTELUKAST 4 MG/1
1 TABLET, CHEWABLE ORAL
Qty: 0 | Refills: 0 | DISCHARGE

## 2022-06-23 RX ORDER — ASPIRIN/CALCIUM CARB/MAGNESIUM 324 MG
81 TABLET ORAL DAILY
Refills: 0 | Status: DISCONTINUED | OUTPATIENT
Start: 2022-06-23 | End: 2022-06-23

## 2022-06-23 RX ORDER — HYDROCHLOROTHIAZIDE 25 MG
12.5 TABLET ORAL DAILY
Refills: 0 | Status: DISCONTINUED | OUTPATIENT
Start: 2022-06-23 | End: 2022-06-23

## 2022-06-23 RX ORDER — SODIUM CHLORIDE 9 MG/ML
1000 INJECTION INTRAMUSCULAR; INTRAVENOUS; SUBCUTANEOUS ONCE
Refills: 0 | Status: COMPLETED | OUTPATIENT
Start: 2022-06-23 | End: 2022-06-23

## 2022-06-23 RX ORDER — TRAMADOL HYDROCHLORIDE 50 MG/1
1 TABLET ORAL
Qty: 0 | Refills: 0 | DISCHARGE

## 2022-06-23 RX ORDER — ASPIRIN/CALCIUM CARB/MAGNESIUM 324 MG
1 TABLET ORAL
Qty: 0 | Refills: 0 | DISCHARGE

## 2022-06-23 RX ORDER — ALBUTEROL 90 UG/1
2 AEROSOL, METERED ORAL
Qty: 0 | Refills: 0 | DISCHARGE

## 2022-06-23 RX ORDER — DIAZEPAM 5 MG
5 TABLET ORAL ONCE
Refills: 0 | Status: DISCONTINUED | OUTPATIENT
Start: 2022-06-23 | End: 2022-06-23

## 2022-06-23 RX ORDER — ATORVASTATIN CALCIUM 80 MG/1
20 TABLET, FILM COATED ORAL AT BEDTIME
Refills: 0 | Status: DISCONTINUED | OUTPATIENT
Start: 2022-06-23 | End: 2022-06-23

## 2022-06-23 RX ORDER — ENOXAPARIN SODIUM 100 MG/ML
40 INJECTION SUBCUTANEOUS EVERY 24 HOURS
Refills: 0 | Status: DISCONTINUED | OUTPATIENT
Start: 2022-06-23 | End: 2022-06-25

## 2022-06-23 RX ORDER — ATORVASTATIN CALCIUM 80 MG/1
1 TABLET, FILM COATED ORAL
Qty: 0 | Refills: 0 | DISCHARGE

## 2022-06-23 RX ORDER — MAGNESIUM OXIDE 400 MG ORAL TABLET 241.3 MG
1 TABLET ORAL
Qty: 0 | Refills: 0 | DISCHARGE

## 2022-06-23 RX ORDER — LANOLIN ALCOHOL/MO/W.PET/CERES
3 CREAM (GRAM) TOPICAL AT BEDTIME
Refills: 0 | Status: DISCONTINUED | OUTPATIENT
Start: 2022-06-23 | End: 2022-06-25

## 2022-06-23 RX ORDER — LOSARTAN POTASSIUM 100 MG/1
100 TABLET, FILM COATED ORAL DAILY
Refills: 0 | Status: DISCONTINUED | OUTPATIENT
Start: 2022-06-23 | End: 2022-06-23

## 2022-06-23 RX ORDER — GABAPENTIN 400 MG/1
2 CAPSULE ORAL
Qty: 0 | Refills: 0 | DISCHARGE

## 2022-06-23 RX ORDER — ALBUTEROL 90 UG/1
2 AEROSOL, METERED ORAL EVERY 6 HOURS
Refills: 0 | Status: DISCONTINUED | OUTPATIENT
Start: 2022-06-23 | End: 2022-06-25

## 2022-06-23 RX ORDER — ONDANSETRON 8 MG/1
4 TABLET, FILM COATED ORAL EVERY 8 HOURS
Refills: 0 | Status: DISCONTINUED | OUTPATIENT
Start: 2022-06-23 | End: 2022-06-25

## 2022-06-23 RX ADMIN — ENOXAPARIN SODIUM 40 MILLIGRAM(S): 100 INJECTION SUBCUTANEOUS at 20:02

## 2022-06-23 RX ADMIN — SODIUM CHLORIDE 1000 MILLILITER(S): 9 INJECTION INTRAMUSCULAR; INTRAVENOUS; SUBCUTANEOUS at 10:26

## 2022-06-23 RX ADMIN — Medication 324 MILLIGRAM(S): at 20:03

## 2022-06-23 RX ADMIN — GABAPENTIN 200 MILLIGRAM(S): 400 CAPSULE ORAL at 21:45

## 2022-06-23 RX ADMIN — ATORVASTATIN CALCIUM 20 MILLIGRAM(S): 80 TABLET, FILM COATED ORAL at 21:45

## 2022-06-23 RX ADMIN — Medication 3 MILLIGRAM(S): at 23:02

## 2022-06-23 RX ADMIN — SODIUM CHLORIDE 1000 MILLILITER(S): 9 INJECTION INTRAMUSCULAR; INTRAVENOUS; SUBCUTANEOUS at 13:26

## 2022-06-23 RX ADMIN — Medication 5 MILLIGRAM(S): at 12:39

## 2022-06-23 RX ADMIN — Medication 25 MILLIGRAM(S): at 10:26

## 2022-06-23 RX ADMIN — Medication 1 GRAM(S): at 20:02

## 2022-06-23 NOTE — H&P ADULT - PROBLEM SELECTOR PLAN 1
Dizziness x 2 days, admitted for r/o CVA   - s/p meclizine and valium in ED without improvement  - CT head unremarkable in ED  - f/u MR/MRA head neck   - EKG: NSR rate 96, T wave inversions anteriorly, cannot rule out ischemia. Similar to previous.  - neuro consulted f/u recs   - on aspirin 81 daily, and lipitor 20mg daily at home  - STAT aspirin 324x1, then 81mg daily tomorrow  - continue home lipitor   - f/u TSH, A1c, lipid panel Dizziness x 2 days, admitted for r/o CVA   - s/p meclizine and valium in ED without improvement  - CT head unremarkable in ED  - f/u MR/MRA head neck   - EKG: NSR rate 96, T wave inversions anteriorly, cannot rule out ischemia. Similar to previous.  - neuro consulted f/u recs   - on aspirin 81 daily, and lipitor 20mg daily at home  - STAT aspirin 324x1, then 81mg daily tomorrow  - continue home lipitor   - f/u TSH, A1c, lipid panel  -passed bedside dysphagia screen as per RN.   -stroke pathway  -permissive HTN

## 2022-06-23 NOTE — H&P ADULT - NSHPREVIEWOFSYSTEMS_GEN_ALL_CORE
CONSTITUTIONAL: denies fever, chills, fatigue, weakness  HEENT: denies blurred vision, sore throat  SKIN: denies new lesions, rash  CARDIOVASCULAR: denies chest pain, chest pressure, palpitations  RESPIRATORY: denies shortness of breath, sputum production  GASTROINTESTINAL: denies nausea, vomiting, diarrhea, abdominal pain  GENITOURINARY: denies dysuria, discharge  NEUROLOGICAL: + mild frontal headache, + dizziness described as room spinning  MUSCULOSKELETAL: denies new joint pain, muscle aches  HEMATOLOGIC: denies gross bleeding, bruising  LYMPHATICS: denies extremity swelling CONSTITUTIONAL: denies fever, chills, fatigue, weakness  HEENT: denies blurred vision, sore throat  SKIN: denies new lesions, rash  CARDIOVASCULAR: denies chest pain, chest pressure, palpitations  RESPIRATORY: denies shortness of breath, sputum production  GASTROINTESTINAL: denies nausea, vomiting, diarrhea, abdominal pain, melena, hematochezia  GENITOURINARY: denies dysuria, discharge  NEUROLOGICAL: + mild frontal headache, + dizziness described as room spinning  MUSCULOSKELETAL: denies new joint pain, muscle aches  HEMATOLOGIC: denies gross bleeding, bruising  LYMPHATICS: denies extremity swelling

## 2022-06-23 NOTE — ED PROVIDER NOTE - CLINICAL SUMMARY MEDICAL DECISION MAKING FREE TEXT BOX
pt with room spinning dizziness intermittent x 3 days. no fevers, chills, ha, n/v, cp, sob, abd pain, weakness, numbness, vision or speech changes. ekg in er abnormal. plan- ekg/ct/labs/ivf/antivert/neuro/cards

## 2022-06-23 NOTE — H&P ADULT - NSHPPHYSICALEXAM_GEN_ALL_CORE
T(C): 36.9 (06-23-22 @ 09:29), Max: 36.9 (06-23-22 @ 09:29)  HR: 97 (06-23-22 @ 09:29) (97 - 97)  BP: 129/78 (06-23-22 @ 09:29) (129/78 - 129/78)  RR: 16 (06-23-22 @ 09:29) (16 - 16)  SpO2: 97% (06-23-22 @ 09:29) (97% - 97%)    GENERAL: NAD   EYES: sclera clear, no exudates  ENMT: oropharynx clear without erythema, no exudates, moist mucous membranes  NECK: supple, soft, no thyromegaly noted  LUNGS: good air entry bilaterally, clear to auscultation, symmetric breath sounds, no wheezing or rhonchi appreciated  HEART: soft S1/S2, regular rate and rhythm, + systolic murmur, no lower extremity edema  GASTROINTESTINAL: abdomen is soft, nontender, nondistended, normoactive bowel sounds, no palpable masses  INTEGUMENT: good skin turgor, warm skin, appears well perfused  MUSCULOSKELETAL: no clubbing or cyanosis, no obvious deformity  NEUROLOGIC: awake, alert, oriented x3, good muscle tone in 4 extremities, no obvious sensory deficits  PSYCHIATRIC: mood is good, affect is congruent, linear and logical thought process  HEME/LYMPH: no obvious ecchymosis or petechiae

## 2022-06-23 NOTE — H&P ADULT - ASSESSMENT
71y F pmhx HTN, HLD, asthma, non-obstructive CAD (cath 2019), chronic back pain, presented for dizziness x 2 days admitted for r/o CVA.

## 2022-06-23 NOTE — PATIENT PROFILE ADULT - VISION (WITH CORRECTIVE LENSES IF THE PATIENT USUALLY WEARS THEM):
requested me talk to  on phone/Normal vision: sees adequately in most situations; can see medication labels, newsprint

## 2022-06-23 NOTE — ED PROVIDER NOTE - NSICDXPASTMEDICALHX_GEN_ALL_CORE_FT
PAST MEDICAL HISTORY:  HLD (hyperlipidemia)     HTN (hypertension)     HTN (hypertension)     Hypercholesterolemia

## 2022-06-23 NOTE — PATIENT PROFILE ADULT - FALL HARM RISK - UNIVERSAL INTERVENTIONS
Bed in lowest position, wheels locked, appropriate side rails in place/Call bell, personal items and telephone in reach/Instruct patient to call for assistance before getting out of bed or chair/Non-slip footwear when patient is out of bed/[place] to call system/Physically safe environment - no spills, clutter or unnecessary equipment/Purposeful Proactive Rounding/Room/bathroom lighting operational, light cord in reach

## 2022-06-23 NOTE — CONSULT NOTE ADULT - ATTENDING COMMENTS
No signs of significant ischemia or volume overload. EKG is essentially unchanged from previous. sx noncardiac in nature. fu with neuro. if dc'd fu with outpt cards.

## 2022-06-23 NOTE — CONSULT NOTE ADULT - ASSESSMENT
72 yo female PMH HTN, HLD, CAD, asthma presents to ED with sudden onset dizziness yesterday morning, denies CP, palpitations. Cardiology consulted for dizziness.     -EKG showing NSR rate 96, T wave inversions anteriorly, cannot rule out ischemia  -Trop neg x1, given duration of symptoms and negative troponin, doubt cardiac etiology, no sign of active ischemia or arrythmia  -Etiology more likely vertigo vs. labyrinthitis vs. epidural reaction  -No cardiac contraindication to discharge, patient advised to follow up with outpatient cardiologist for repeat stress test/TTE             70 yo female PMH HTN, HLD, CAD, asthma presents to ED with sudden onset dizziness yesterday morning, denies CP, palpitations. Cardiology consulted for dizziness.     -EKG showing NSR rate 96, T wave inversions anteriorly, cannot rule out ischemia. similar to previous  -Trop neg x1, given duration of symptoms and negative troponin, doubt cardiac etiology, no sign of active ischemia or arrythmia  -Etiology more likely vertigo vs. labyrinthitis vs. epidural reaction  -No cardiac contraindication to discharge, patient advised to follow up with outpatient cardiologist for repeat stress test/TTE

## 2022-06-23 NOTE — H&P ADULT - NSICDXPASTMEDICALHX_GEN_ALL_CORE_FT
PAST MEDICAL HISTORY:  Asthma     CAD (coronary artery disease) non obstructive    HLD (hyperlipidemia)     HTN (hypertension)     HTN (hypertension)     Hypercholesterolemia

## 2022-06-23 NOTE — H&P ADULT - PROBLEM SELECTOR PLAN 3
Chronic  - VSS  - continue home Losartan-HCTZ Chronic  - VSS  - cpermissive HTN Chronic  - VSS  - hold home losartan-HCTZ for permissive HTN

## 2022-06-23 NOTE — ED PROVIDER NOTE - PROGRESS NOTE DETAILS
renny (cards) seen eval pt, cleared for d/c and outpt f/u with her cards pt still symptomatic, carmela (neuro) recommends admit for MRI/MRA

## 2022-06-23 NOTE — H&P ADULT - ATTENDING COMMENTS
71y F pmhx HTN, HLD, asthma, non-obstructive CAD (cath 2019), chronic back pain, presented for dizziness x 2 days admitted for r/o CVA.     HPI as above. Patient seen and examined at bedside.     T(C): 36.9 (06-23-22 @ 09:29), Max: 36.9 (06-23-22 @ 09:29)  HR: 97 (06-23-22 @ 09:29) (97 - 97)  BP: 129/78 (06-23-22 @ 09:29) (129/78 - 129/78)  RR: 16 (06-23-22 @ 09:29) (16 - 16)  SpO2: 97% (06-23-22 @ 09:29) (97% - 97%)  Wt(kg): --    Physical Exam:   GENERAL: well-groomed, well-developed, NAD  HEENT: head NC/AT; EOM intact, PERRLA, conjunctiva & sclera clear; hearing grossly intact, moist mucous membranes  NECK: supple, no JVD  RESPIRATORY: CTA B/L, no wheezing, rales, rhonchi or rubs  CARDIOVASCULAR: S1&S2, RRR, no murmurs or gallops  ABDOMEN: soft, non-tender, non-distended, + Bowel sounds x4 quadrants, no guarding, rebound or rigidity  MUSCULOSKELETAL:  no clubbing, cyanosis or edema of all 4 extremities  LYMPH: no cervical lymphadenopathy  VASCULAR: Radial pulses 2+ bilaterally, no varicose veins   SKIN: warm and dry, color normal  NEUROLOGIC: AA&O X3, CN2-12 intact w/ no focal deficits, no sensory loss, motor Strength 5/5 in UE & LE B/L  Psych: Normal mood and affect, normal behavior    Plan:   Get MRI/MRA head, TTE, lioids A1c  -neuro checks as ordered  -eval for TIA/CVA  -permissive HTN    lekocytosis: likely reactively. no infection suspected.   dvt ppx: lovenox

## 2022-06-23 NOTE — ED ADULT NURSE NOTE - OBJECTIVE STATEMENT
the patient presents to the er with complaints of dizziness for 2 days.  iv access obtained , labs collected, dysphagia performed (passed) and orthostatics recorded.  patient taken to ct scan.

## 2022-06-23 NOTE — H&P ADULT - HISTORY OF PRESENT ILLNESS
71y F pmhx HTN, HLD, asthma, non-obstructive CAD (cath 2019), chronic back pain, presents to ED for dizziness described as room spinning x 2 days. Dizziness has been constant over the past two days and is worse when she changes positions from sitting to standing causing her to fall. She has been using a walker due to the dizziness. She also notes a mild frontal HA 4/10 in severity that started today. Of note, patient had steroid injection in her lower back for chronic back pain. She denies fever, chills, ear pain, vision changes, hearing problems, cp, sob, abd pain, n/v/d,  sx,     ED Course:  Vitals: 98.5F, 97bpm, 129/78, 97% RA orthostatics negative  CT head: unremarkable   Labs: WBC 11, GFR 53, cardiac enzymes negative   EKG: NSR rate 96, T wave inversions anteriorly, cannot rule out ischemia. Similar to previous.    Patient received: 1L bolus NS, valium 5mg, meclizine 25mg 71y F pmhx HTN, HLD, asthma, non-obstructive CAD (cath 2019), chronic back pain, presents to ED for dizziness described as room spinning x 2 days. Dizziness has been constant over the past two days and is worse when she changes positions from sitting to standing causing her to fall. She has been using a walker due to the dizziness. She also notes a mild frontal HA 4/10 in severity that started today. Of note, patient had steroid injection in her lower back for chronic back pain yesterday. She denies fever, chills, ear pain, vision changes, hearing problems, cp, sob, abd pain, n/v/d,  sx, focal weakness, numbness, tingling.     ED Course:  Vitals: 98.5F, 97bpm, 129/78, 97% RA orthostatics negative  CT head: unremarkable   Labs: WBC 11, GFR 53, cardiac enzymes negative   EKG: NSR rate 96, T wave inversions anteriorly, cannot rule out ischemia. Similar to previous.    Patient received: 1L bolus NS, valium 5mg, meclizine 25mg Alert and oriented to person, place and time

## 2022-06-23 NOTE — ED PROVIDER NOTE - CARE PROVIDER_API CALL
Percy Lacey)  Cardiovascular Disease; Internal Medicine  [address], [address] [place] [address]  Phone: [phone]  Fax: [phone]  Follow Up Time: 1-3 Days

## 2022-06-23 NOTE — ED ADULT TRIAGE NOTE - CHIEF COMPLAINT QUOTE
dizziness x 2 days "feels like the room is spinning in circles"  pt also complaints of cortisone shot to left side of back and having pain while walking

## 2022-06-23 NOTE — ED ADULT NURSE REASSESSMENT NOTE - NS ED NURSE REASSESS COMMENT FT1
1512:  patient resting comfortably.  no distress noted.  safety maintained.  will continue to monitor.  nelia curtis.

## 2022-06-23 NOTE — ED PROVIDER NOTE - OBJECTIVE STATEMENT
pt c/o room spinning dizziness intermittent x 3 days, worse with position changes. no fevers, chills, ha, n/v, weakness, numbness, vision or speech changes, cp, sob, abd pain, or any other complaints.  pmd - alfa (flushing)

## 2022-06-23 NOTE — ED PROVIDER NOTE - CHPI ED SYMPTOMS NEG
no blurred vision/no fever/no loss of consciousness/no nausea/no numbness/no vomiting/no weakness/no change in level of consciousness

## 2022-06-23 NOTE — H&P ADULT - NSHPSOCIALHISTORY_GEN_ALL_CORE
Lives with .  Ambulated without assistance until recently using walker due to dizziness  Never smoker. Alcohol socially. No drug use.  [employer] 2/2.

## 2022-06-23 NOTE — H&P ADULT - PROBLEM SELECTOR PLAN 2
s/p cath 2019: dx mild CAD  - TTE 2019: mild concentric LVH, EF  60-65%  - NST 2019: normal LV with EF >70%, small mild defects in distal inferior and inferoapical walls that are reversible   - STAT aspirin 324x1, then 81mg daily tomorrow  - continue home lipitor   - cardiology consulted recs appreciated

## 2022-06-23 NOTE — CONSULT NOTE ADULT - SUBJECTIVE AND OBJECTIVE BOX
Patient is a 71y old  Female who presents with a chief complaint of     HPI: 72 yo female PMH HTN, HLD, CAD, asthma presents to ED with dizziness. Onset yesterday morning suddenly when she woke up. Patient describes her dizziness as the room spinning, exacerbated with moving her head and walking.       PAST MEDICAL & SURGICAL HISTORY:  Hypercholesterolemia  HTN (hypertension)  HTN (hypertension)  HLD (hyperlipidemia)  No significant past surgical history                ECHO  FINDINGS:      MEDICATIONS  (STANDING):    MEDICATIONS  (PRN):      FAMILY HISTORY:    Denies Family history of CAD or early MI      Constitutional: denies fever, chills  HEENT: denies blurry vision, difficulty hearing  Respiratory: denies SOB, NANCE, cough  Cardiovascular: denies CP, palpitations, orthopnea, PND, LE edema  Gastrointestinal: denies nausea, vomiting, abdominal pain  Genitourinary: denies urinary changes  Skin: Denies rashes, itching  Neurologic: denies headache, weakness, dizziness  Hematology/Oncology: denies bleeding, easy bruising  ROS negative except as noted above      SOCIAL HISTORY:    No tobacco, Alcohol or Ddrug use    Vital Signs Last 24 Hrs  T(C): 36.9 (23 Jun 2022 09:29), Max: 36.9 (23 Jun 2022 09:29)  T(F): 98.5 (23 Jun 2022 09:29), Max: 98.5 (23 Jun 2022 09:29)  HR: 97 (23 Jun 2022 09:29) (97 - 97)  BP: 129/78 (23 Jun 2022 09:29) (129/78 - 129/78)  BP(mean): --  RR: 16 (23 Jun 2022 09:29) (16 - 16)  SpO2: 97% (23 Jun 2022 09:29) (97% - 97%)    Physical Exam:  General: Well developed, well nourished, NAD  HEENT: NCAT, PERRLA, EOMI bl, moist mucous membranes   Neck: Supple, nontender, no mass  Neurology: A&Ox3, nonfocal, sensation intact   Respiratory: CTA B/L, No W/R/R  CV: RRR, +S1/S2, no murmurs, rubs or gallops  Abdominal: Soft, NT, ND +BSx4, no palpable masses  Extremities: No C/C/E, + peripheral pulses  MSK: Normal ROM, no joint erythema or warmth, no joint swelling   Heme: No obvious ecchymosis or petechiae   Skin: warm, dry, normal color      ECG:    I&O's Detail      LABS:                        13.2   11.00 )-----------( 358      ( 23 Jun 2022 10:18 )             40.1     06-23    139  |  110<H>  |  31<H>  ----------------------------<  145<H>  4.0   |  20<L>  |  1.10    Ca    9.2      23 Jun 2022 10:18    TPro  7.8  /  Alb  4.0  /  TBili  0.6  /  DBili  x   /  AST  24  /  ALT  34  /  AlkPhos  72  06-23        PT/INR - ( 23 Jun 2022 10:18 )   PT: 11.7 sec;   INR: 1.00 ratio         PTT - ( 23 Jun 2022 10:18 )  PTT:27.6 sec    I&O's Summary    BNP  RADIOLOGY & ADDITIONAL STUDIES: Patient is a 71y old  Female who presents with a chief complaint of     HPI: 72 yo female PMH HTN, HLD, CAD, asthma presents to ED with dizziness. Onset yesterday morning suddenly when she woke up. Patient describes her dizziness as the room spinning, exacerbated with moving her head and walking. Patient has had similar symptoms many years ago, cannot recall what she was diagnosed with. Denies any CP, palpitations, ear pain. Denies any recent congestion however c/o cough and runny nose. Patient also notes yesterday she had an epidural injection due to LLE sciatica. States her symptoms were present prior to injection. Patient has seen cardiologist Dr. Percy Brian, last seen Oct 2020. Patient had NST 11/5/19 showed small mild defects in distal inferior and inferoapical walls suggestive of mild ischemia. TTE from 11/5/19 showed mild concentric LVH, EF 60-65%, no segmental WMA. Patient underwent cardiac cath on 11/22/19 showing normal coronary arteries, mild CAD.        PAST MEDICAL & SURGICAL HISTORY:  Hypercholesterolemia  HTN (hypertension)  HTN (hypertension)  HLD (hyperlipidemia)  No significant past surgical history                ECHO  FINDINGS: 11/5/19 showed mild concentric LVH, EF 60-65%, no segmental WMA      MEDICATIONS  (STANDING):    MEDICATIONS  (PRN):      FAMILY HISTORY:  Denies Family history of CAD or early MI      Constitutional: denies fever, chills  HEENT: denies congestion, sore throat  Respiratory: denies SOB, NANCE, cough  Cardiovascular: denies CP, palpitations, LE edema  Gastrointestinal: denies nausea, vomiting, abdominal pain  Neurologic: +dizziness, denies headache, weakness        SOCIAL HISTORY:  No tobacco, Alcohol or drug use    Vital Signs Last 24 Hrs  T(C): 36.9 (23 Jun 2022 09:29), Max: 36.9 (23 Jun 2022 09:29)  T(F): 98.5 (23 Jun 2022 09:29), Max: 98.5 (23 Jun 2022 09:29)  HR: 97 (23 Jun 2022 09:29) (97 - 97)  BP: 129/78 (23 Jun 2022 09:29) (129/78 - 129/78)  BP(mean): --  RR: 16 (23 Jun 2022 09:29) (16 - 16)  SpO2: 97% (23 Jun 2022 09:29) (97% - 97%)    Physical Exam:  General: Well developed, well nourished, NAD  HEENT: NCAT, PERRLA, EOMI bl, moist mucous membranes   Neck: Supple  Neurology: answering questions appropriately, moving all extremities spontaneously  Respiratory: CTA B/L, No W/R/R  CV: RRR, +S1/S2, no murmurs  Abdominal: Soft, NT, ND +BSx4, no palpable masses  Extremities: No C/C/E, + peripheral pulses  Skin: warm, dry, normal color      ECG: NSR rate 96, T wave inversions anteriorly, cannot rule out ischemia    I&O's Detail      LABS:                        13.2   11.00 )-----------( 358      ( 23 Jun 2022 10:18 )             40.1     06-23    139  |  110<H>  |  31<H>  ----------------------------<  145<H>  4.0   |  20<L>  |  1.10    Ca    9.2      23 Jun 2022 10:18    TPro  7.8  /  Alb  4.0  /  TBili  0.6  /  DBili  x   /  AST  24  /  ALT  34  /  AlkPhos  72  06-23        PT/INR - ( 23 Jun 2022 10:18 )   PT: 11.7 sec;   INR: 1.00 ratio         PTT - ( 23 Jun 2022 10:18 )  PTT:27.6 sec    I&O's Summary    BNP  RADIOLOGY & ADDITIONAL STUDIES:  < from: CT Head No Cont (06.23.22 @ 10:45) >    ACC: [id number] EXAM:  CT BRAIN                          PROCEDURE DATE:  06/23/2022          INTERPRETATION:  Clinical indication: Dizziness    Technique:  Multiple axial sections were acquired from the base of the skull to the   vertex without contrast enhancement. Coronal and sagittal reconstructed   images were performed as well..    This exam is compared prior head CT performed on February 23, 2015.    Findings:  The lateral ventricles have a normal configuration.    There is no evidence of acute hemorrhage, mass or mass-effect in the   posterior fossa or in the supratentorial region.    Evaluation of the osseous structures with the appropriate window appears   unremarkable.    The visualized paranasal sinuses mastoid and middle ear regions appear   clear.    Impression:  Unremarkable noncontrast head CT.    --- End of Report ---            COLLEEN LAKHANI MD; Attending Radiologist  This document has been electronically signed. Jun 23 2022 10:52AM    < end of copied text >   Patient is a 71y old  Female who presents with a chief complaint of     HPI: 72 yo female PMH HTN, HLD, CAD, asthma presents to ED with dizziness. Onset yesterday morning suddenly when she woke up. Patient describes her dizziness as the room spinning, exacerbated with moving her head and walking. Patient has had similar symptoms many years ago, cannot recall what she was diagnosed with. Denies any CP, palpitations, ear pain. Denies any recent congestion however c/o cough and runny nose. Patient also notes yesterday she had an epidural injection due to LLE sciatica. States her symptoms were present prior to injection. Patient has seen cardiologist Dr. Percy Lacey, last seen Oct 2020. Patient had NST 11/5/19 showed small mild defects in distal inferior and inferoapical walls suggestive of mild ischemia. TTE from 11/5/19 showed mild concentric LVH, EF 60-65%, no segmental WMA. Patient underwent cardiac cath on 11/22/19 showing normal coronary arteries, mild CAD.        PAST MEDICAL & SURGICAL HISTORY:  Hypercholesterolemia  HTN (hypertension)  HTN (hypertension)  HLD (hyperlipidemia)  No significant past surgical history                ECHO  FINDINGS: 11/5/19 showed mild concentric LVH, EF 60-65%, no segmental WMA      MEDICATIONS  (STANDING):    MEDICATIONS  (PRN):      FAMILY HISTORY:  Denies Family history of CAD or early MI      Constitutional: denies fever, chills  HEENT: denies congestion, sore throat  Respiratory: denies SOB, NANCE, cough  Cardiovascular: denies CP, palpitations, LE edema  Gastrointestinal: denies nausea, vomiting, abdominal pain  Neurologic: +dizziness, denies headache, weakness        SOCIAL HISTORY:  No tobacco, Alcohol or drug use    Vital Signs Last 24 Hrs  T(C): 36.9 (23 Jun 2022 09:29), Max: 36.9 (23 Jun 2022 09:29)  T(F): 98.5 (23 Jun 2022 09:29), Max: 98.5 (23 Jun 2022 09:29)  HR: 97 (23 Jun 2022 09:29) (97 - 97)  BP: 129/78 (23 Jun 2022 09:29) (129/78 - 129/78)  BP(mean): --  RR: 16 (23 Jun 2022 09:29) (16 - 16)  SpO2: 97% (23 Jun 2022 09:29) (97% - 97%)    Physical Exam:  General: Well developed, well nourished, NAD  HEENT: NCAT, PERRLA, EOMI bl, moist mucous membranes   Neck: Supple  Neurology: answering questions appropriately, moving all extremities spontaneously  Respiratory: CTA B/L, No W/R/R  CV: RRR, +S1/S2, no murmurs  Abdominal: Soft, NT, ND +BSx4, no palpable masses  Extremities: No C/C/E, + peripheral pulses  Skin: warm, dry, normal color      ECG: NSR rate 96, T wave inversions anteriorly, cannot rule out ischemia    I&O's Detail      LABS:                        13.2   11.00 )-----------( 358      ( 23 Jun 2022 10:18 )             40.1     06-23    139  |  110<H>  |  31<H>  ----------------------------<  145<H>  4.0   |  20<L>  |  1.10    Ca    9.2      23 Jun 2022 10:18    TPro  7.8  /  Alb  4.0  /  TBili  0.6  /  DBili  x   /  AST  24  /  ALT  34  /  AlkPhos  72  06-23        PT/INR - ( 23 Jun 2022 10:18 )   PT: 11.7 sec;   INR: 1.00 ratio         PTT - ( 23 Jun 2022 10:18 )  PTT:27.6 sec    I&O's Summary    BNP  RADIOLOGY & ADDITIONAL STUDIES:  < from: CT Head No Cont (06.23.22 @ 10:45) >    ACC: [id number] EXAM:  CT BRAIN                          PROCEDURE DATE:  06/23/2022          INTERPRETATION:  Clinical indication: Dizziness    Technique:  Multiple axial sections were acquired from the base of the skull to the   vertex without contrast enhancement. Coronal and sagittal reconstructed   images were performed as well..    This exam is compared prior head CT performed on February 23, 2015.    Findings:  The lateral ventricles have a normal configuration.    There is no evidence of acute hemorrhage, mass or mass-effect in the   posterior fossa or in the supratentorial region.    Evaluation of the osseous structures with the appropriate window appears   unremarkable.    The visualized paranasal sinuses mastoid and middle ear regions appear   clear.    Impression:  Unremarkable noncontrast head CT.    --- End of Report ---            COLLEEN LAKHANI MD; Attending Radiologist  This document has been electronically signed. Jun 23 2022 10:52AM    < end of copied text >   Patient is a 71y old  Female who presents with a chief complaint of     HPI: 72 yo female PMH HTN, HLD, CAD, asthma presents to ED with dizziness. Onset yesterday morning suddenly when she woke up. Patient describes her dizziness as the room spinning, exacerbated with moving her head and walking. Patient has had similar symptoms many years ago, cannot recall what she was diagnosed with. Denies any CP, palpitations, ear pain. Denies any recent congestion however c/o cough and runny nose. Patient also notes yesterday she had an epidural injection due to LLE sciatica. States her symptoms were present prior to injection. Patient has seen cardiologist Dr. Percy Lacey, last seen Oct 2020. Patient had NST 11/5/19 showed small mild defects in distal inferior and inferoapical walls suggestive of mild ischemia. TTE from 11/5/19 showed mild concentric LVH, EF 60-65%, no segmental WMA. Patient underwent cardiac cath on 11/22/19 showing normal coronary arteries, mild CAD.        PAST MEDICAL & SURGICAL HISTORY:  Hypercholesterolemia  HTN (hypertension)  HTN (hypertension)  HLD (hyperlipidemia)  No significant past surgical history          ECHO  FINDINGS: 11/5/19 showed mild concentric LVH, EF 60-65%, no segmental WMA        FAMILY HISTORY:  Denies Family history of CAD or early MI      ROS otherwise negative.       SOCIAL HISTORY:  No tobacco, Alcohol or drug use    Vital Signs Last 24 Hrs  T(C): 36.9 (23 Jun 2022 09:29), Max: 36.9 (23 Jun 2022 09:29)  T(F): 98.5 (23 Jun 2022 09:29), Max: 98.5 (23 Jun 2022 09:29)  HR: 97 (23 Jun 2022 09:29) (97 - 97)  BP: 129/78 (23 Jun 2022 09:29) (129/78 - 129/78)  BP(mean): --  RR: 16 (23 Jun 2022 09:29) (16 - 16)  SpO2: 97% (23 Jun 2022 09:29) (97% - 97%)    Physical Exam:  General: Well developed, well nourished, NAD  HEENT: NCAT, PERRLA, EOMI bl, moist mucous membranes   Neck: Supple  Neurology: answering questions appropriately, moving all extremities spontaneously  Respiratory: CTA B/L, No W/R/R  CV: RRR, +S1/S2, no murmurs  Abdominal: Soft, NT, ND +BSx4, no palpable masses  Extremities: No C/C/E, + peripheral pulses  Skin: warm, dry, normal color      ECG: NSR rate 96, T wave inversions anteriorly, cannot rule out ischemia    I&O's Detail      LABS:                        13.2   11.00 )-----------( 358      ( 23 Jun 2022 10:18 )             40.1     06-23    139  |  110<H>  |  31<H>  ----------------------------<  145<H>  4.0   |  20<L>  |  1.10    Ca    9.2      23 Jun 2022 10:18    TPro  7.8  /  Alb  4.0  /  TBili  0.6  /  DBili  x   /  AST  24  /  ALT  34  /  AlkPhos  72  06-23        PT/INR - ( 23 Jun 2022 10:18 )   PT: 11.7 sec;   INR: 1.00 ratio         PTT - ( 23 Jun 2022 10:18 )  PTT:27.6 sec    I&O's Summary    BNP  RADIOLOGY & ADDITIONAL STUDIES:  < from: CT Head No Cont (06.23.22 @ 10:45) >    ACC: [id number] EXAM:  CT BRAIN                          PROCEDURE DATE:  06/23/2022          INTERPRETATION:  Clinical indication: Dizziness    Technique:  Multiple axial sections were acquired from the base of the skull to the   vertex without contrast enhancement. Coronal and sagittal reconstructed   images were performed as well..    This exam is compared prior head CT performed on February 23, 2015.    Findings:  The lateral ventricles have a normal configuration.    There is no evidence of acute hemorrhage, mass or mass-effect in the   posterior fossa or in the supratentorial region.    Evaluation of the osseous structures with the appropriate window appears   unremarkable.    The visualized paranasal sinuses mastoid and middle ear regions appear   clear.    Impression:  Unremarkable noncontrast head CT.    --- End of Report ---            COLLEEN LAKHANI MD; Attending Radiologist  This document has been electronically signed. Jun 23 2022 10:52AM    < end of copied text >

## 2022-06-24 ENCOUNTER — TRANSCRIPTION ENCOUNTER (OUTPATIENT)
Age: 72
End: 2022-06-24

## 2022-06-24 LAB
A1C WITH ESTIMATED AVERAGE GLUCOSE RESULT: 6.2 % — HIGH (ref 4–5.6)
ANION GAP SERPL CALC-SCNC: 7 MMOL/L — SIGNIFICANT CHANGE UP (ref 5–17)
BUN SERPL-MCNC: 29 MG/DL — HIGH (ref 7–23)
CALCIUM SERPL-MCNC: 8.3 MG/DL — LOW (ref 8.5–10.1)
CHLORIDE SERPL-SCNC: 112 MMOL/L — HIGH (ref 96–108)
CHOLEST SERPL-MCNC: 223 MG/DL — HIGH
CO2 SERPL-SCNC: 23 MMOL/L — SIGNIFICANT CHANGE UP (ref 22–31)
CREAT SERPL-MCNC: 0.87 MG/DL — SIGNIFICANT CHANGE UP (ref 0.5–1.3)
EGFR: 70 ML/MIN/1.73M2 — SIGNIFICANT CHANGE UP
ESTIMATED AVERAGE GLUCOSE: 131 MG/DL — HIGH (ref 68–114)
GLUCOSE SERPL-MCNC: 117 MG/DL — HIGH (ref 70–99)
HCT VFR BLD CALC: 35.2 % — SIGNIFICANT CHANGE UP (ref 34.5–45)
HCV AB S/CO SERPL IA: 0.08 S/CO — SIGNIFICANT CHANGE UP (ref 0–0.99)
HCV AB SERPL-IMP: SIGNIFICANT CHANGE UP
HDLC SERPL-MCNC: 37 MG/DL — LOW
HGB BLD-MCNC: 11.4 G/DL — LOW (ref 11.5–15.5)
LIPID PNL WITH DIRECT LDL SERPL: 147 MG/DL — HIGH
MCHC RBC-ENTMCNC: 30.6 PG — SIGNIFICANT CHANGE UP (ref 27–34)
MCHC RBC-ENTMCNC: 32.4 GM/DL — SIGNIFICANT CHANGE UP (ref 32–36)
MCV RBC AUTO: 94.4 FL — SIGNIFICANT CHANGE UP (ref 80–100)
NON HDL CHOLESTEROL: 186 MG/DL — HIGH
NRBC # BLD: 0 /100 WBCS — SIGNIFICANT CHANGE UP (ref 0–0)
PLATELET # BLD AUTO: 322 K/UL — SIGNIFICANT CHANGE UP (ref 150–400)
POTASSIUM SERPL-MCNC: 3.7 MMOL/L — SIGNIFICANT CHANGE UP (ref 3.5–5.3)
POTASSIUM SERPL-SCNC: 3.7 MMOL/L — SIGNIFICANT CHANGE UP (ref 3.5–5.3)
RBC # BLD: 3.73 M/UL — LOW (ref 3.8–5.2)
RBC # FLD: 11.5 % — SIGNIFICANT CHANGE UP (ref 10.3–14.5)
SODIUM SERPL-SCNC: 142 MMOL/L — SIGNIFICANT CHANGE UP (ref 135–145)
TRIGL SERPL-MCNC: 194 MG/DL — HIGH
TSH SERPL-MCNC: 0.2 UIU/ML — LOW (ref 0.36–3.74)
WBC # BLD: 9.1 K/UL — SIGNIFICANT CHANGE UP (ref 3.8–10.5)
WBC # FLD AUTO: 9.1 K/UL — SIGNIFICANT CHANGE UP (ref 3.8–10.5)

## 2022-06-24 PROCEDURE — 99232 SBSQ HOSP IP/OBS MODERATE 35: CPT

## 2022-06-24 PROCEDURE — 99233 SBSQ HOSP IP/OBS HIGH 50: CPT

## 2022-06-24 RX ORDER — MECLIZINE HCL 12.5 MG
25 TABLET ORAL ONCE
Refills: 0 | Status: COMPLETED | OUTPATIENT
Start: 2022-06-24 | End: 2022-06-24

## 2022-06-24 RX ORDER — MECLIZINE HCL 12.5 MG
25 TABLET ORAL EVERY 8 HOURS
Refills: 0 | Status: DISCONTINUED | OUTPATIENT
Start: 2022-06-24 | End: 2022-06-24

## 2022-06-24 RX ORDER — MECLIZINE HCL 12.5 MG
25 TABLET ORAL EVERY 8 HOURS
Refills: 0 | Status: DISCONTINUED | OUTPATIENT
Start: 2022-06-25 | End: 2022-06-25

## 2022-06-24 RX ADMIN — Medication 3 MILLIGRAM(S): at 21:12

## 2022-06-24 RX ADMIN — ENOXAPARIN SODIUM 40 MILLIGRAM(S): 100 INJECTION SUBCUTANEOUS at 21:12

## 2022-06-24 RX ADMIN — Medication 81 MILLIGRAM(S): at 13:29

## 2022-06-24 RX ADMIN — GABAPENTIN 200 MILLIGRAM(S): 400 CAPSULE ORAL at 21:13

## 2022-06-24 RX ADMIN — GABAPENTIN 200 MILLIGRAM(S): 400 CAPSULE ORAL at 05:32

## 2022-06-24 RX ADMIN — ATORVASTATIN CALCIUM 20 MILLIGRAM(S): 80 TABLET, FILM COATED ORAL at 21:12

## 2022-06-24 RX ADMIN — Medication 5 MILLIGRAM(S): at 13:29

## 2022-06-24 RX ADMIN — MONTELUKAST 10 MILLIGRAM(S): 4 TABLET, CHEWABLE ORAL at 13:29

## 2022-06-24 RX ADMIN — Medication 25 MILLIGRAM(S): at 18:35

## 2022-06-24 RX ADMIN — Medication 1 GRAM(S): at 21:16

## 2022-06-24 RX ADMIN — GABAPENTIN 200 MILLIGRAM(S): 400 CAPSULE ORAL at 13:30

## 2022-06-24 RX ADMIN — Medication 1 GRAM(S): at 05:32

## 2022-06-24 NOTE — DISCHARGE NOTE PROVIDER - PROVIDER TOKENS
PROVIDER:[TOKEN:[[id number]]] PROVIDER:[TOKEN:[[id number]]],PROVIDER:[TOKEN:[[id number]]]

## 2022-06-24 NOTE — DISCHARGE NOTE PROVIDER - NSDCMRMEDTOKEN_GEN_ALL_CORE_FT
aspirin 81 mg oral tablet: 1 tab(s) orally once a day  Breo Ellipta 100 mcg-25 mcg/inh inhalation powder: 1 puff(s) inhaled once a day  diclofenac 1.3% topical film, extended release: Apply topically to affected area once a day  gabapentin 100 mg oral capsule: 2 tab(s) orally 3 times a day  Hyzaar 100 mg-12.5 mg oral tablet: 1 tab(s) orally once a day  Lipitor 20 mg oral tablet: 1 tab(s) orally once a day  Lovaza 1000 mg oral capsule: 1 tab(s) orally 2 times a day  naproxen 500 mg oral tablet: 1 tab(s) orally 2 times a day, As Needed  oxybutynin 5 mg oral tablet: 1 tab(s) orally once a day  ProAir HFA 90 mcg/inh inhalation aerosol: 2 puff(s) inhaled every 6 hours, As Needed  Senna 8.6 mg oral tablet: 2 tab(s) orally once a day (at bedtime)  Singulair 10 mg oral tablet: 1 tab(s) orally once a day  traMADol 50 mg oral tablet: 1 tab(s) orally 2 times a day, As Needed  Vitamin C: 1 tab(s) orally once a day   acetaminophen 325 mg oral tablet: 2 tab(s) orally every 6 hours, As needed, for pain  aspirin 81 mg oral tablet: 1 tab(s) orally once a day  Breo Ellipta 100 mcg-25 mcg/inh inhalation powder: 1 puff(s) inhaled once a day  diclofenac 1.3% topical film, extended release: Apply topically to affected area once a day  gabapentin 100 mg oral capsule: 2 tab(s) orally 3 times a day  Lipitor 20 mg oral tablet: 1 tab(s) orally once a day  Lovaza 1000 mg oral capsule: 1 tab(s) orally 2 times a day  meclizine 25 mg oral tablet: 1 tab(s) orally every 8 hours, As Needed for vertigo-type dizziness  naproxen 500 mg oral tablet: 1 tab(s) orally 2 times a day, As Needed  oxybutynin 5 mg oral tablet: 1 tab(s) orally once a day  ProAir HFA 90 mcg/inh inhalation aerosol: 2 puff(s) inhaled every 6 hours, As Needed  Senna 8.6 mg oral tablet: 2 tab(s) orally once a day (at bedtime)  Singulair 10 mg oral tablet: 1 tab(s) orally once a day  traMADol 50 mg oral tablet: 1 tab(s) orally 2 times a day, As Needed  Vitamin C: 1 tab(s) orally once a day

## 2022-06-24 NOTE — DISCHARGE NOTE PROVIDER - CARE PROVIDER_API CALL
Eliza Sandoval)  Neurology  [address] [place] [address]  Phone: [phone]  Fax: [phone]  Follow Up Time:    Eliza Sandoval)  Neurology  [address] [place] [address]  Phone: [phone]  Fax: [phone]  Follow Up Time:     ANTONIETA KELLY  Internal Medicine  [id number], [address] [address]  Phone: [phone]  Fax: [phone]  Follow Up Time:

## 2022-06-24 NOTE — PROGRESS NOTE ADULT - NS ATTEND AMEND GEN_ALL_CORE FT
No signs of significant ischemia or volume overload. EKG is essentially unchanged from previous. sx noncardiac in nature. echo is normal.

## 2022-06-24 NOTE — PROGRESS NOTE ADULT - PROBLEM SELECTOR PLAN 2
s/p cath 2019: dx mild CAD  - TTE 2019: mild concentric LVH, EF  60-65%  - NST 2019: normal LV with EF >70%, small mild defects in distal inferior and inferoapical walls that are reversible   - c/w ASA 81mg po daily  - continue home lipitor   - cardiology ([facility]) consulted recs appreciated

## 2022-06-24 NOTE — DISCHARGE NOTE PROVIDER - HOSPITAL COURSE
70yo F w/ PMH of HTN, HLD, asthma, non-obstructive CAD (cath 2019), chronic back pain, presented for dizziness x 2 days admitted for r/o CVA. Pt had CT, MRI and MRA brain all of which showed no sign of stroke or significant vessel occlusion. Carotid doppler did not have significant stenosis. Pt was not orthostatic on orthostatic VS. Neuro (negrita) and cardio ([facility]) consulted. Pt felt improved with meclizine. Pt discharged to home and will have close follow up as outpatient. 72yo F w/ PMH of HTN, HLD, asthma, non-obstructive CAD (cath 2019), chronic back pain, presented for dizziness x 2 days admitted for r/o CVA. Neuro (Connie) and cardio ([facility]) consulted. Pt had CT, MRI and MRA brain all of which showed no sign of stroke or significant vessel occlusion. Carotid doppler did not have significant stenosis. Pt was not orthostatic on orthostatic VS. Pt felt improved with meclizine and dizziness was likely due to BPPV. Pt discharged to home and will have close follow up as outpatient.     On day of discharge:  ROS: dizziness resolved. Denies fever, chills, headache, focal weakness, changes in sensation, SOB, CP.  Vital Signs Last 24 Hrs  T(C): 36.7 (25 Jun 2022 05:09), Max: 36.9 (24 Jun 2022 14:36)  T(F): 98.1 (25 Jun 2022 05:09), Max: 98.5 (24 Jun 2022 14:36)  HR: 78 (25 Jun 2022 05:09) (59 - 78)  BP: 122/60 (25 Jun 2022 05:09) (114/60 - 122/60)  BP(mean): --  RR: 18 (25 Jun 2022 05:09) (18 - 18)  SpO2: 96% (25 Jun 2022 05:09) (94% - 96%)    PHYSICAL EXAM:  GENERAL: NAD at rest  HEENT:  anicteric, moist mucous membranes  CHEST/LUNG:  CTA b/l, no rales, wheezes, or rhonchi  HEART:  RRR, S1, S2  ABDOMEN:  BS+, soft, nontender, nondistended  EXTREMITIES: no edema, cyanosis, or calf tenderness  NERVOUS SYSTEM: answers questions and follows commands appropriately    Time spent: 40min

## 2022-06-24 NOTE — PROGRESS NOTE ADULT - SUBJECTIVE AND OBJECTIVE BOX
[facility] -- Luis Nicole Grossman, [place], Lalito Corley [facility], [place]: Office # [phone]    Follow Up:  Dizziness     Subjective/Observations: Patient seen and examined. Patient awake, alert, resting in bed. No complaints of chest pain, dyspnea, palpitations. No signs of orthopnea or PND. Reports improvement, but continues to be slightly dizzy.     REVIEW OF SYSTEMS: All other review of systems are negative unless indicated above    PAST MEDICAL & SURGICAL HISTORY:  Hypercholesterolemia      HTN (hypertension)      HTN (hypertension)      HLD (hyperlipidemia)      CAD (coronary artery disease)  non obstructive      Asthma      History of cardiac cath      MEDICATIONS  (STANDING):  aspirin  chewable 81 milliGRAM(s) Oral daily  atorvastatin 20 milliGRAM(s) Oral at bedtime  enoxaparin Injectable 40 milliGRAM(s) SubCutaneous every 24 hours  gabapentin 200 milliGRAM(s) Oral every 8 hours  montelukast 10 milliGRAM(s) Oral daily  omega-3-Acid Ethyl Esters 1 Gram(s) Oral two times a day  oxybutynin 5 milliGRAM(s) Oral daily    MEDICATIONS  (PRN):  acetaminophen     Tablet .. 650 milliGRAM(s) Oral every 6 hours PRN Temp greater or equal to 38C (100.4F), Mild Pain (1 - 3)  ALBUTerol    90 MICROgram(s) HFA Inhaler 2 Puff(s) Inhalation every 6 hours PRN Bronchospasm  aluminum hydroxide/magnesium hydroxide/simethicone Suspension 30 milliLiter(s) Oral every 4 hours PRN Dyspepsia  melatonin 3 milliGRAM(s) Oral at bedtime PRN Insomnia  ondansetron Injectable 4 milliGRAM(s) IV Push every 8 hours PRN Nausea and/or Vomiting  traMADol 50 milliGRAM(s) Oral every 8 hours PRN Moderate Pain (4 - 6)    Allergies  No Known Allergies    Vital Signs Last 24 Hrs  T(C): 36.3 (24 Jun 2022 05:19), Max: 37.1 (23 Jun 2022 22:01)  T(F): 97.4 (24 Jun 2022 05:19), Max: 98.7 (23 Jun 2022 22:01)  HR: 57 (24 Jun 2022 05:19) (57 - 81)  BP: 115/63 (24 Jun 2022 05:19) (101/63 - 144/71)  BP(mean): --  RR: 18 (24 Jun 2022 05:19) (16 - 18)  SpO2: 94% (24 Jun 2022 05:19) (94% - 99%)  I&O's Summary        TELE: SR 50-80s   PHYSICAL EXAM:  Constitutional: NAD, awake and alert, Well developed   HEENT: Moist Mucous Membranes, Anicteric  Pulmonary: Non-labored, breath sounds are clear bilaterally, No wheezing, rales or rhonchi  Cardiovascular: Regular, S1 and S2, No murmurs, No rubs, gallops or clicks  Gastrointestinal:  soft, nontender, nondistended   Lymph: No peripheral edema. No lymphadenopathy.   Skin: No visible rashes or ulcers.  Psych:  Mood & affect appropriate      LABS: All Labs Reviewed:                        13.2   11.00 )-----------( 358      ( 23 Jun 2022 10:18 )             40.1     23 Jun 2022 16:43    143    |  110    |  24     ----------------------------<  135    3.6     |  25     |  0.86   23 Jun 2022 10:18    139    |  110    |  31     ----------------------------<  145    4.0     |  20     |  1.10     Ca    8.9        23 Jun 2022 16:43  Ca    9.2        23 Jun 2022 10:18    TPro  7.1    /  Alb  3.7    /  TBili  0.5    /  DBili  x      /  AST  17     /  ALT  30     /  AlkPhos  62     23 Jun 2022 16:43  TPro  7.8    /  Alb  4.0    /  TBili  0.6    /  DBili  x      /  AST  24     /  ALT  34     /  AlkPhos  72     23 Jun 2022 10:18   LIVER FUNCTIONS - ( 23 Jun 2022 16:43 )  Alb: 3.7 g/dL / Pro: 7.1 g/dL / ALK PHOS: 62 U/L / ALT: 30 U/L / AST: 17 U/L / GGT: x           PT/INR - ( 23 Jun 2022 10:18 )   PT: 11.7 sec;   INR: 1.00 ratio         PTT - ( 23 Jun 2022 10:18 )  PTT:27.6 secTroponin I, High Sensitivity Result: 9.3 ng/L (06-23-22 @ 10:18)    12 Lead ECG:   Ventricular Rate 96 BPM    Atrial Rate 96 BPM    P-R Interval 172 ms    QRS Duration 86 ms    Q-T Interval 364 ms    QTC Calculation(Bazett) 459 ms    P Axis 40 degrees    R Axis 66 degrees    T Axis 73 degrees    Diagnosis Line Normal sinus rhythm  Nonspecific T wave abnormality  Abnormal ECG  When compared with ECG of 23-FEB-2015 15:27,  Questionable change in QRS axis  Nonspecific T wave abnormality, worse in Lateral leads  Confirmed by ANTONIO SAEED ([id number]) on 6/23/2022 5:30:55 PM (06-23-22 @ 09:42)

## 2022-06-24 NOTE — PROGRESS NOTE ADULT - SUBJECTIVE AND OBJECTIVE BOX
Patient is a 71y old  Female who presents with a chief complaint of dizziness.     phone used for Yi translation: ID: [id number]  INTERVAL HPI/OVERNIGHT EVENTS: Pt states dizziness is a bit better than yesterday, but still very significant with sitting up / standing. It feels as if room is spinning as opposed to lightheadedness. Could not stand up on her own today. Denies headache, vision changes, focal weakness, abd pain, SOB, CP.    MEDICATIONS  (STANDING):  aspirin  chewable 81 milliGRAM(s) Oral daily  atorvastatin 20 milliGRAM(s) Oral at bedtime  enoxaparin Injectable 40 milliGRAM(s) SubCutaneous every 24 hours  gabapentin 200 milliGRAM(s) Oral every 8 hours  montelukast 10 milliGRAM(s) Oral daily  omega-3-Acid Ethyl Esters 1 Gram(s) Oral two times a day  oxybutynin 5 milliGRAM(s) Oral daily    MEDICATIONS  (PRN):  acetaminophen     Tablet .. 650 milliGRAM(s) Oral every 6 hours PRN Temp greater or equal to 38C (100.4F), Mild Pain (1 - 3)  ALBUTerol    90 MICROgram(s) HFA Inhaler 2 Puff(s) Inhalation every 6 hours PRN Bronchospasm  aluminum hydroxide/magnesium hydroxide/simethicone Suspension 30 milliLiter(s) Oral every 4 hours PRN Dyspepsia  melatonin 3 milliGRAM(s) Oral at bedtime PRN Insomnia  ondansetron Injectable 4 milliGRAM(s) IV Push every 8 hours PRN Nausea and/or Vomiting  traMADol 50 milliGRAM(s) Oral every 8 hours PRN Moderate Pain (4 - 6)      Allergies    No Known Allergies    Intolerances        REVIEW OF SYSTEMS:  CONSTITUTIONAL: No fever or chills  HEENT:  No headache, no sore throat  RESPIRATORY: admits cough (gradually improving since recent COVID infection), denies wheezing, or shortness of breath  CARDIOVASCULAR: No chest pain, palpitations  GASTROINTESTINAL: No abd pain, nausea, vomiting, or diarrhea  GENITOURINARY: No dysuria, frequency, or hematuria  NEUROLOGICAL: no focal weakness ; ++vertiginous dizziness  MUSCULOSKELETAL: no myalgias     Vital Signs Last 24 Hrs  T(C): 36.3 (24 Jun 2022 05:19), Max: 37.1 (23 Jun 2022 22:01)  T(F): 97.4 (24 Jun 2022 05:19), Max: 98.7 (23 Jun 2022 22:01)  HR: 57 (24 Jun 2022 05:19) (57 - 81)  BP: 115/63 (24 Jun 2022 05:19) (101/63 - 144/71)  BP(mean): --  RR: 18 (24 Jun 2022 05:19) (16 - 18)  SpO2: 94% (24 Jun 2022 05:19) (94% - 99%)    PHYSICAL EXAM:  GENERAL: NAD at rest  HEENT:  anicteric, moist mucous membranes  CHEST/LUNG:  CTA b/l, no rales, wheezes, or rhonchi  HEART:  RRR, S1, S2  ABDOMEN:  BS+, soft, nontender, nondistended  EXTREMITIES: no edema, cyanosis, or calf tenderness  NERVOUS SYSTEM: answers questions and follows commands appropriately    LABS:                          11.4   9.10  )-----------( 322      ( 24 Jun 2022 08:06 )             35.2     CBC Full  -  ( 24 Jun 2022 08:06 )  WBC Count : 9.10 K/uL  Hemoglobin : 11.4 g/dL  Hematocrit : 35.2 %  Platelet Count - Automated : 322 K/uL  Mean Cell Volume : 94.4 fl  Mean Cell Hemoglobin : 30.6 pg  Mean Cell Hemoglobin Concentration : 32.4 gm/dL  Auto Neutrophil # : x  Auto Lymphocyte # : x  Auto Monocyte # : x  Auto Eosinophil # : x  Auto Basophil # : x  Auto Neutrophil % : x  Auto Lymphocyte % : x  Auto Monocyte % : x  Auto Eosinophil % : x  Auto Basophil % : x    06-24    142  |  112<H>  |  29<H>  ----------------------------<  117<H>  3.7   |  23  |  0.87    Ca    8.3<L>      24 Jun 2022 08:06    TPro  7.1  /  Alb  3.7  /  TBili  0.5  /  DBili  x   /  AST  17  /  ALT  30  /  AlkPhos  62  06-23        PT/INR - ( 23 Jun 2022 10:18 )   PT: 11.7 sec;   INR: 1.00 ratio         PTT - ( 23 Jun 2022 10:18 )  PTT:27.6 sec    CAPILLARY BLOOD GLUCOSE              RADIOLOGY & ADDITIONAL TESTS:    Personally reviewed.     Consultant(s) Notes Reviewed:  [x] YES  [ ] NO

## 2022-06-24 NOTE — DISCHARGE NOTE PROVIDER - NSDCCPCAREPLAN_GEN_ALL_CORE_FT
PRINCIPAL DISCHARGE DIAGNOSIS  Diagnosis: Vertigo  Assessment and Plan of Treatment: Your dizziness was likely vertigo related to imbalance from your inner ear. You have improved with a medication called meclizine. Please continue to take meclizine every 8 hours today and starting tomorrow take it only if needed for recurrence of "room-spinning" type dizziness symptoms. Do not take the meclizine, if you are not feeling dizzy.   Do not drive for now until you have fully recovered. Walk with assistance of family for now to decrease chance of fall until your dizziness has fully resolved.   Follow up with your PMD.  You had CT scan, MRI and MRA of your brain which showed you did not have a stroke. You had ultrasound Doppler of your carotid arteries in your neck which did not have significant stenosis.   You may also follow up with neurologist, Dr. Sandoval (number below) in the next 1-2 weeks.      SECONDARY DISCHARGE DIAGNOSES  Diagnosis: HTN (hypertension)  Assessment and Plan of Treatment: Your blood pressure medication was not given in the hospital and your blood pressure has been on the lower end of normal range without it.   Stop taking that medication (Hyzaar aka losartan/hydrochlorothiazide) for now to avoid having too low a blood pressure.  Monitor your blood pressure 1-2 times per day at home and make a daily log of values to show your PMD. Follow up with your PMD within a week and discuss your hospitalization and also show your BP log. Ask whether you need to restart any blood pressure medication and if so, at what dose you should start it.    Diagnosis: Asthma  Assessment and Plan of Treatment: Continue your home regimen and follow up with your PMD.

## 2022-06-25 ENCOUNTER — TRANSCRIPTION ENCOUNTER (OUTPATIENT)
Age: 72
End: 2022-06-25

## 2022-06-25 VITALS
SYSTOLIC BLOOD PRESSURE: 149 MMHG | OXYGEN SATURATION: 94 % | RESPIRATION RATE: 19 BRPM | TEMPERATURE: 99 F | HEART RATE: 60 BPM | DIASTOLIC BLOOD PRESSURE: 75 MMHG

## 2022-06-25 LAB
ANION GAP SERPL CALC-SCNC: 9 MMOL/L — SIGNIFICANT CHANGE UP (ref 5–17)
BUN SERPL-MCNC: 26 MG/DL — HIGH (ref 7–23)
CALCIUM SERPL-MCNC: 8.4 MG/DL — LOW (ref 8.5–10.1)
CHLORIDE SERPL-SCNC: 111 MMOL/L — HIGH (ref 96–108)
CO2 SERPL-SCNC: 23 MMOL/L — SIGNIFICANT CHANGE UP (ref 22–31)
CREAT SERPL-MCNC: 0.99 MG/DL — SIGNIFICANT CHANGE UP (ref 0.5–1.3)
EGFR: 60 ML/MIN/1.73M2 — SIGNIFICANT CHANGE UP
GLUCOSE SERPL-MCNC: 182 MG/DL — HIGH (ref 70–99)
HCT VFR BLD CALC: 36 % — SIGNIFICANT CHANGE UP (ref 34.5–45)
HGB BLD-MCNC: 11.6 G/DL — SIGNIFICANT CHANGE UP (ref 11.5–15.5)
MCHC RBC-ENTMCNC: 30.3 PG — SIGNIFICANT CHANGE UP (ref 27–34)
MCHC RBC-ENTMCNC: 32.2 GM/DL — SIGNIFICANT CHANGE UP (ref 32–36)
MCV RBC AUTO: 94 FL — SIGNIFICANT CHANGE UP (ref 80–100)
NRBC # BLD: 0 /100 WBCS — SIGNIFICANT CHANGE UP (ref 0–0)
PLATELET # BLD AUTO: 273 K/UL — SIGNIFICANT CHANGE UP (ref 150–400)
POTASSIUM SERPL-MCNC: 3.6 MMOL/L — SIGNIFICANT CHANGE UP (ref 3.5–5.3)
POTASSIUM SERPL-SCNC: 3.6 MMOL/L — SIGNIFICANT CHANGE UP (ref 3.5–5.3)
RBC # BLD: 3.83 M/UL — SIGNIFICANT CHANGE UP (ref 3.8–5.2)
RBC # FLD: 11.4 % — SIGNIFICANT CHANGE UP (ref 10.3–14.5)
SODIUM SERPL-SCNC: 143 MMOL/L — SIGNIFICANT CHANGE UP (ref 135–145)
T3 SERPL-MCNC: 55 NG/DL — LOW (ref 80–200)
T4 FREE SERPL-MCNC: 1.4 NG/DL — SIGNIFICANT CHANGE UP (ref 0.9–1.8)
WBC # BLD: 4.61 K/UL — SIGNIFICANT CHANGE UP (ref 3.8–10.5)
WBC # FLD AUTO: 4.61 K/UL — SIGNIFICANT CHANGE UP (ref 3.8–10.5)

## 2022-06-25 PROCEDURE — 99285 EMERGENCY DEPT VISIT HI MDM: CPT

## 2022-06-25 PROCEDURE — 86803 HEPATITIS C AB TEST: CPT

## 2022-06-25 PROCEDURE — 84484 ASSAY OF TROPONIN QUANT: CPT

## 2022-06-25 PROCEDURE — 85025 COMPLETE CBC W/AUTO DIFF WBC: CPT

## 2022-06-25 PROCEDURE — 80053 COMPREHEN METABOLIC PANEL: CPT

## 2022-06-25 PROCEDURE — 83036 HEMOGLOBIN GLYCOSYLATED A1C: CPT

## 2022-06-25 PROCEDURE — 84480 ASSAY TRIIODOTHYRONINE (T3): CPT

## 2022-06-25 PROCEDURE — 99239 HOSP IP/OBS DSCHRG MGMT >30: CPT

## 2022-06-25 PROCEDURE — 36415 COLL VENOUS BLD VENIPUNCTURE: CPT

## 2022-06-25 PROCEDURE — 85027 COMPLETE CBC AUTOMATED: CPT

## 2022-06-25 PROCEDURE — 85730 THROMBOPLASTIN TIME PARTIAL: CPT

## 2022-06-25 PROCEDURE — 70551 MRI BRAIN STEM W/O DYE: CPT

## 2022-06-25 PROCEDURE — U0003: CPT

## 2022-06-25 PROCEDURE — 85610 PROTHROMBIN TIME: CPT

## 2022-06-25 PROCEDURE — 80061 LIPID PANEL: CPT

## 2022-06-25 PROCEDURE — 70450 CT HEAD/BRAIN W/O DYE: CPT | Mod: MA

## 2022-06-25 PROCEDURE — 80048 BASIC METABOLIC PNL TOTAL CA: CPT

## 2022-06-25 PROCEDURE — 84439 ASSAY OF FREE THYROXINE: CPT

## 2022-06-25 PROCEDURE — 93880 EXTRACRANIAL BILAT STUDY: CPT

## 2022-06-25 PROCEDURE — U0005: CPT

## 2022-06-25 PROCEDURE — 70544 MR ANGIOGRAPHY HEAD W/O DYE: CPT

## 2022-06-25 PROCEDURE — 84443 ASSAY THYROID STIM HORMONE: CPT

## 2022-06-25 PROCEDURE — 99232 SBSQ HOSP IP/OBS MODERATE 35: CPT

## 2022-06-25 PROCEDURE — 93306 TTE W/DOPPLER COMPLETE: CPT

## 2022-06-25 PROCEDURE — 93005 ELECTROCARDIOGRAM TRACING: CPT

## 2022-06-25 RX ORDER — LOSARTAN/HYDROCHLOROTHIAZIDE 100MG-25MG
1 TABLET ORAL
Qty: 0 | Refills: 0 | DISCHARGE

## 2022-06-25 RX ORDER — MECLIZINE HCL 12.5 MG
1 TABLET ORAL
Qty: 15 | Refills: 0
Start: 2022-06-25 | End: 2022-06-29

## 2022-06-25 RX ORDER — ACETAMINOPHEN 500 MG
2 TABLET ORAL
Qty: 0 | Refills: 0 | DISCHARGE
Start: 2022-06-25

## 2022-06-25 RX ADMIN — GABAPENTIN 200 MILLIGRAM(S): 400 CAPSULE ORAL at 14:29

## 2022-06-25 RX ADMIN — Medication 81 MILLIGRAM(S): at 12:51

## 2022-06-25 RX ADMIN — Medication 5 MILLIGRAM(S): at 12:51

## 2022-06-25 RX ADMIN — Medication 25 MILLIGRAM(S): at 14:29

## 2022-06-25 RX ADMIN — Medication 1 GRAM(S): at 05:16

## 2022-06-25 RX ADMIN — MONTELUKAST 10 MILLIGRAM(S): 4 TABLET, CHEWABLE ORAL at 12:51

## 2022-06-25 RX ADMIN — GABAPENTIN 200 MILLIGRAM(S): 400 CAPSULE ORAL at 05:16

## 2022-06-25 RX ADMIN — Medication 25 MILLIGRAM(S): at 05:16

## 2022-06-25 NOTE — DISCHARGE NOTE NURSING/CASE MANAGEMENT/SOCIAL WORK - PATIENT PORTAL LINK FT
You can access the FollowMyHealth Patient Portal offered by [facility] by registering at the following website: [email or web address]. By joining [facility]’s FollowMyHealth portal, you will also be able to view your health information using other applications (apps) compatible with our system.

## 2022-06-25 NOTE — PROGRESS NOTE ADULT - ASSESSMENT
70yo F w/ PMH of HTN, HLD, asthma, non-obstructive CAD (cath 2019), chronic back pain, presented for dizziness x 2 days admitted for r/o CVA. 
72 yo female PMH HTN, HLD, CAD, asthma presents to ED with sudden onset dizziness yesterday morning, denies CP, palpitations. Cardiology consulted for dizziness, admitted for r/o CVA.     - Reports Dizziness x 2 days, admitted for r/o CVA   - s/p meclizine and valium in ED without improvement  - CT head unremarkable in ED  - F/u MR/ MRA head neck     - EKG showing NSR rate 96, T wave inversions anteriorly, cannot rule out ischemia, similar to previous  - Echo: 11-5-2019 normal LVEF   - Cardiac Cath: 12/2019 mild CAD   - Trop neg x1, given duration of symptoms and negative troponin, doubt cardiac etiology, no sign of active ischemia or arrythmia  - Continue aspirin and Lipitor    - - 120's   - Holding hold home losartan-HCTZ for permissive HTN     - Tele with SB/SR 40-60s, no arrhythmias noted   - No evidence of any meaningful volume overload     - Monitor and replete lytes, keep K>4, Mg>2.  - Will continue to follow.    Nancy Hart, [facility]  Nurse Practitioner - Cardiology   Spectra #[phone]
72 yo female PMH HTN, HLD, CAD, asthma presents to ED with sudden onset dizziness yesterday morning, denies CP, palpitations. Cardiology consulted for dizziness, admitted for r/o CVA.     - Reports Dizziness x 2 days, admitted for r/o CVA   - s/p meclizine and valium in ED without improvement  - CT head unremarkable in ED  - F/u MR/ MRA head neck     - EKG showing NSR rate 96, T wave inversions anteriorly, cannot rule out ischemia, similar to previous  - Echo: 11-5-2019 normal LVEF   - Cardiac Cath: 12/2019 mild CAD   - Trop neg x1, given duration of symptoms and negative troponin, doubt cardiac etiology, no sign of active ischemia or arrythmia  - Continue aspirin and Lipitor    - -140s   - Holding hold home losartan-HCTZ for permissive HTN     - Tele with SR 50-80s, no arrhythmias noted   - No evidence of any meaningful volume overload     - Monitor and replete lytes, keep K>4, Mg>2.  - Will continue to follow.    Aleja Felix, MS FNP, AGAP  Nurse Practitioner- Cardiology   Spectra #[phone]

## 2022-06-25 NOTE — PROGRESS NOTE ADULT - SUBJECTIVE AND OBJECTIVE BOX
[facility] -- Luis Nicole Grossman, [place], Lalito Corley Savella, Goodger: Office # [phone]    Follow Up:  DIzziness    Subjective/Observations:     REVIEW OF SYSTEMS: All review of systems is negative for eye, ENT, GI, , allergic, dermatologic, musculoskeletal and neurologic except as described above    PAST MEDICAL & SURGICAL HISTORY:  Hypercholesterolemia      HTN (hypertension)      HTN (hypertension)      HLD (hyperlipidemia)      CAD (coronary artery disease)  non obstructive      Asthma      History of cardiac cath          MEDICATIONS  (STANDING):  aspirin  chewable 81 milliGRAM(s) Oral daily  atorvastatin 20 milliGRAM(s) Oral at bedtime  enoxaparin Injectable 40 milliGRAM(s) SubCutaneous every 24 hours  gabapentin 200 milliGRAM(s) Oral every 8 hours  meclizine 25 milliGRAM(s) Oral every 8 hours  montelukast 10 milliGRAM(s) Oral daily  omega-3-Acid Ethyl Esters 1 Gram(s) Oral two times a day  oxybutynin 5 milliGRAM(s) Oral daily    MEDICATIONS  (PRN):  acetaminophen     Tablet .. 650 milliGRAM(s) Oral every 6 hours PRN Temp greater or equal to 38C (100.4F), Mild Pain (1 - 3)  ALBUTerol    90 MICROgram(s) HFA Inhaler 2 Puff(s) Inhalation every 6 hours PRN Bronchospasm  aluminum hydroxide/magnesium hydroxide/simethicone Suspension 30 milliLiter(s) Oral every 4 hours PRN Dyspepsia  melatonin 3 milliGRAM(s) Oral at bedtime PRN Insomnia  ondansetron Injectable 4 milliGRAM(s) IV Push every 8 hours PRN Nausea and/or Vomiting  traMADol 50 milliGRAM(s) Oral every 8 hours PRN Moderate Pain (4 - 6)    Allergies    No Known Allergies    Intolerances      Vital Signs Last 24 Hrs  T(C): 36.7 (25 Jun 2022 05:09), Max: 36.9 (24 Jun 2022 14:36)  T(F): 98.1 (25 Jun 2022 05:09), Max: 98.5 (24 Jun 2022 14:36)  HR: 78 (25 Jun 2022 05:09) (59 - 78)  BP: 122/60 (25 Jun 2022 05:09) (114/60 - 122/60)  BP(mean): --  RR: 18 (25 Jun 2022 05:09) (18 - 18)  SpO2: 96% (25 Jun 2022 05:09) (94% - 96%)  I&O's Summary        TELE: SB/SR 48-60  PHYSICAL EXAM:  Constitutional: NAD, awake and alert, Well developed   HEENT: Moist Mucous Membranes, Anicteric  Pulmonary: Non-labored, breath sounds are clear bilaterally, No wheezing, rales or rhonchi  Cardiovascular: Regular, S1 and S2, No murmurs, No rubs, gallops or clicks  Gastrointestinal:  soft, nontender, nondistended   Lymph: No peripheral edema. No lymphadenopathy.   Skin: No visible rashes or ulcers.  Psych:  Mood & affect appropriate      LABS: All Labs Reviewed:                        11.6   4.61  )-----------( 273      ( 25 Jun 2022 09:20 )             36.0                         11.4   9.10  )-----------( 322      ( 24 Jun 2022 08:06 )             35.2                         13.2   11.00 )-----------( 358      ( 23 Jun 2022 10:18 )             40.1     25 Jun 2022 09:20    143    |  111    |  26     ----------------------------<  182    3.6     |  23     |  0.99   24 Jun 2022 08:06    142    |  112    |  29     ----------------------------<  117    3.7     |  23     |  0.87   23 Jun 2022 16:43    143    |  110    |  24     ----------------------------<  135    3.6     |  25     |  0.86     Ca    8.4        25 Jun 2022 09:20  Ca    8.3        24 Jun 2022 08:06  Ca    8.9        23 Jun 2022 16:43    TPro  7.1    /  Alb  3.7    /  TBili  0.5    /  DBili  x      /  AST  17     /  ALT  30     /  AlkPhos  62     23 Jun 2022 16:43  TPro  7.8    /  Alb  4.0    /  TBili  0.6    /  DBili  x      /  AST  24     /  ALT  34     /  AlkPhos  72     23 Jun 2022 10:18      Troponin I, High Sensitivity Result: 9.3 ng/L (06-23-22 @ 10:18)            Cholesterol, Serum: 223 mg/dL (06-24-22 @ 08:06)  HDL Cholesterol, Serum: 37 mg/dL (06-24-22 @ 08:06)  Triglycerides, Serum: 194 mg/dL (06-24-22 @ 08:06)      12 Lead ECG:   Ventricular Rate 96 BPM    Atrial Rate 96 BPM    P-R Interval 172 ms    QRS Duration 86 ms    Q-T Interval 364 ms    QTC Calculation(Bazett) 459 ms    P Axis 40 degrees    R Axis 66 degrees    T Axis 73 degrees    Diagnosis Line Normal sinus rhythm  Nonspecific T wave abnormality  Abnormal ECG  When compared with ECG of 23-FEB-2015 15:27,  Questionable change in QRS axis  Nonspecific T wave abnormality, worse in Lateral leads  Confirmed by ANTONIO SAEED ([id number]) on 6/23/2022 5:30:55 PM (06-23-22 @ 09:42)     [facility] -- Luis Nicole Grossman, [place], Lalito Corley Savella, Goodger: Office # [phone]    Follow Up:  DIzziness    Subjective/Observations: Patient seen and examined, awake, alert, resting comfortably in bed, denies chest pain, dyspnea, palpitations or dizziness, orthopnea and PND. Tolerating room air.     REVIEW OF SYSTEMS: All review of systems is negative for eye, ENT, GI, , allergic, dermatologic, musculoskeletal and neurologic except as described above    PAST MEDICAL & SURGICAL HISTORY:  Hypercholesterolemia      HTN (hypertension)      HTN (hypertension)      HLD (hyperlipidemia)      CAD (coronary artery disease)  non obstructive      Asthma      History of cardiac cath          MEDICATIONS  (STANDING):  aspirin  chewable 81 milliGRAM(s) Oral daily  atorvastatin 20 milliGRAM(s) Oral at bedtime  enoxaparin Injectable 40 milliGRAM(s) SubCutaneous every 24 hours  gabapentin 200 milliGRAM(s) Oral every 8 hours  meclizine 25 milliGRAM(s) Oral every 8 hours  montelukast 10 milliGRAM(s) Oral daily  omega-3-Acid Ethyl Esters 1 Gram(s) Oral two times a day  oxybutynin 5 milliGRAM(s) Oral daily    MEDICATIONS  (PRN):  acetaminophen     Tablet .. 650 milliGRAM(s) Oral every 6 hours PRN Temp greater or equal to 38C (100.4F), Mild Pain (1 - 3)  ALBUTerol    90 MICROgram(s) HFA Inhaler 2 Puff(s) Inhalation every 6 hours PRN Bronchospasm  aluminum hydroxide/magnesium hydroxide/simethicone Suspension 30 milliLiter(s) Oral every 4 hours PRN Dyspepsia  melatonin 3 milliGRAM(s) Oral at bedtime PRN Insomnia  ondansetron Injectable 4 milliGRAM(s) IV Push every 8 hours PRN Nausea and/or Vomiting  traMADol 50 milliGRAM(s) Oral every 8 hours PRN Moderate Pain (4 - 6)    Allergies    No Known Allergies    Intolerances      Vital Signs Last 24 Hrs  T(C): 36.7 (25 Jun 2022 05:09), Max: 36.9 (24 Jun 2022 14:36)  T(F): 98.1 (25 Jun 2022 05:09), Max: 98.5 (24 Jun 2022 14:36)  HR: 78 (25 Jun 2022 05:09) (59 - 78)  BP: 122/60 (25 Jun 2022 05:09) (114/60 - 122/60)  BP(mean): --  RR: 18 (25 Jun 2022 05:09) (18 - 18)  SpO2: 96% (25 Jun 2022 05:09) (94% - 96%)  I&O's Summary        TELE: SB/SR 48-60  PHYSICAL EXAM:  Constitutional: NAD, awake and alert, Well developed   HEENT: Moist Mucous Membranes, Anicteric  Pulmonary: Non-labored, breath sounds are clear bilaterally, No wheezing, rales or rhonchi  Cardiovascular: Regular, S1 and S2, No murmurs, No rubs, gallops or clicks  Gastrointestinal:  soft, nontender, nondistended   Lymph: No peripheral edema. No lymphadenopathy.   Skin: No visible rashes or ulcers.  Psych:  Mood & affect appropriate      LABS: All Labs Reviewed:                        11.6   4.61  )-----------( 273      ( 25 Jun 2022 09:20 )             36.0                         11.4   9.10  )-----------( 322      ( 24 Jun 2022 08:06 )             35.2                         13.2   11.00 )-----------( 358      ( 23 Jun 2022 10:18 )             40.1     25 Jun 2022 09:20    143    |  111    |  26     ----------------------------<  182    3.6     |  23     |  0.99   24 Jun 2022 08:06    142    |  112    |  29     ----------------------------<  117    3.7     |  23     |  0.87   23 Jun 2022 16:43    143    |  110    |  24     ----------------------------<  135    3.6     |  25     |  0.86     Ca    8.4        25 Jun 2022 09:20  Ca    8.3        24 Jun 2022 08:06  Ca    8.9        23 Jun 2022 16:43    TPro  7.1    /  Alb  3.7    /  TBili  0.5    /  DBili  x      /  AST  17     /  ALT  30     /  AlkPhos  62     23 Jun 2022 16:43  TPro  7.8    /  Alb  4.0    /  TBili  0.6    /  DBili  x      /  AST  24     /  ALT  34     /  AlkPhos  72     23 Jun 2022 10:18      Troponin I, High Sensitivity Result: 9.3 ng/L (06-23-22 @ 10:18)            Cholesterol, Serum: 223 mg/dL (06-24-22 @ 08:06)  HDL Cholesterol, Serum: 37 mg/dL (06-24-22 @ 08:06)  Triglycerides, Serum: 194 mg/dL (06-24-22 @ 08:06)      12 Lead ECG:   Ventricular Rate 96 BPM    Atrial Rate 96 BPM    P-R Interval 172 ms    QRS Duration 86 ms    Q-T Interval 364 ms    QTC Calculation(Bazett) 459 ms    P Axis 40 degrees    R Axis 66 degrees    T Axis 73 degrees    Diagnosis Line Normal sinus rhythm  Nonspecific T wave abnormality  Abnormal ECG  When compared with ECG of 23-FEB-2015 15:27,  Questionable change in QRS axis  Nonspecific T wave abnormality, worse in Lateral leads  Confirmed by ANTONIO SAEED ([id number]) on 6/23/2022 5:30:55 PM (06-23-22 @ 09:42)

## 2022-06-25 NOTE — DISCHARGE NOTE NURSING/CASE MANAGEMENT/SOCIAL WORK - NSDCPEFALRISK_GEN_ALL_CORE
For information on Fall & Injury Prevention, visit: [email or web address] OR  [email or web address] OR  https://www.cdc.gov/steadi/patient.html

## 2022-10-21 NOTE — ED ADULT NURSE NOTE - PAIN RATING/NUMBER SCALE (0-10): ACTIVITY
Render Risk Assessment In Note?: no Detail Level: Generalized Additional Notes: Mother (BCC) and Daughter (BCC) 0

## 2024-04-22 NOTE — H&P ADULT - HISTORY OF PRESENT ILLNESS
Duplicate.    skeleton    68 y o f with PMH of HTN, HLD presented to her doctor c/o NANCE which has been getting worse over the last few months. Pt also claims that she had syncopal episode one year ago. She has noted to have frequent palpitations with accompanied dizziness. Pt denies CP, PND/orthopnea, LE edema, n/v, fever/chills, blood in the stool. Pt underwent NST on 11/5/19 which indicated normal LV with EF >70%, small mild defects in distal inferior and inferoapical walls that are reversible suggestive of mild RCA/LCx ischemia. ECHO done on 11/5/19 showed mild concentric LVH, EF  60-65%, no segmental WMA, normal RV, minimal TR. In light of pt's risk factors, symptoms mentioned above and abnormal NST, pt is now referred to [facility] for recommended cardiac cath with possible intervention. **SKELETON    69 yo Irish speaking F with PMHx of HTN, HLD presented to her Cardiologist with  c/o progressively worsening NANCE upon ambulation of ___ city blocks over past few months. Pt also claims that she had syncopal episode one year ago. She has noted to have frequent palpitations with accompanied dizziness. Pt denies CP, PND/orthopnea, LE edema, n/v, fever/chills, blood in the stool. NST 11/5/19: normal LV with EF >70%, small mild defects in distal inferior and inferoapical walls that are reversible suggestive of mild RCA/LCx ischemia. ECHO 11/5/19: mild concentric LVH, EF  60-65%, no segmental WMA, normal RV, minimal TR. In light of pt's risk factors, symptoms mentioned above and abnormal NST, pt is now referred to [facility] for recommended cardiac cath with possible intervention. 69 yo Czech speaking F with PMHx of HTN, HLD, Asthma (no hospitalizations - says this is borderline and that she stopped using inhalers because they don't work) presented to her Cardiologist with c/o constant SOB which worsens with any movement and is better with rest. Pt also claims that she had syncopal episode one year ago. She has noted to have frequent palpitations with accompanied dizziness. Pt denies CP, PND/orthopnea, LE edema, n/v, fever/chills, blood in the stool/melena. NST 11/5/19: normal LV with EF >70%, small mild defects in distal inferior and inferoapical walls that are reversible suggestive of mild RCA/LCx ischemia. ECHO 11/5/19: mild concentric LVH, EF  60-65%, no segmental WMA, normal RV, minimal TR. In light of pt's risk factors, Class IV anginal equivalent symptoms, and abnormal NST, pt is now referred to [facility] for recommended cardiac cath with possible intervention.

## 2024-07-20 ENCOUNTER — EMERGENCY (EMERGENCY)
Facility: HOSPITAL | Age: 74
LOS: 1 days | Discharge: HOME | End: 2024-07-20
Attending: STUDENT IN AN ORGANIZED HEALTH CARE EDUCATION/TRAINING PROGRAM | Admitting: STUDENT IN AN ORGANIZED HEALTH CARE EDUCATION/TRAINING PROGRAM
Payer: MEDICARE

## 2024-07-20 VITALS
HEIGHT: 62 IN | OXYGEN SATURATION: 99 % | RESPIRATION RATE: 19 BRPM | DIASTOLIC BLOOD PRESSURE: 77 MMHG | TEMPERATURE: 98 F | WEIGHT: 130.07 LBS | SYSTOLIC BLOOD PRESSURE: 163 MMHG | HEART RATE: 64 BPM

## 2024-07-20 VITALS
OXYGEN SATURATION: 98 % | HEART RATE: 66 BPM | SYSTOLIC BLOOD PRESSURE: 139 MMHG | TEMPERATURE: 98 F | DIASTOLIC BLOOD PRESSURE: 65 MMHG | RESPIRATION RATE: 18 BRPM

## 2024-07-20 DIAGNOSIS — Z98.890 OTHER SPECIFIED POSTPROCEDURAL STATES: Chronic | ICD-10-CM

## 2024-07-20 PROBLEM — I25.10 ATHEROSCLEROTIC HEART DISEASE OF NATIVE CORONARY ARTERY WITHOUT ANGINA PECTORIS: Chronic | Status: ACTIVE | Noted: 2022-06-23

## 2024-07-20 PROBLEM — J45.909 UNSPECIFIED ASTHMA, UNCOMPLICATED: Chronic | Status: ACTIVE | Noted: 2022-06-23

## 2024-07-20 LAB
ALBUMIN SERPL ELPH-MCNC: 3.7 G/DL — SIGNIFICANT CHANGE UP (ref 3.3–5)
ALP SERPL-CCNC: 76 U/L — SIGNIFICANT CHANGE UP (ref 40–120)
ALT FLD-CCNC: 34 U/L — SIGNIFICANT CHANGE UP (ref 12–78)
ANION GAP SERPL CALC-SCNC: 5 MMOL/L — SIGNIFICANT CHANGE UP (ref 5–17)
APTT BLD: 25.5 SEC — SIGNIFICANT CHANGE UP (ref 24.5–35.6)
AST SERPL-CCNC: 27 U/L — SIGNIFICANT CHANGE UP (ref 15–37)
BASOPHILS # BLD AUTO: 0.02 K/UL — SIGNIFICANT CHANGE UP (ref 0–0.2)
BASOPHILS NFR BLD AUTO: 0.4 % — SIGNIFICANT CHANGE UP (ref 0–2)
BILIRUB SERPL-MCNC: 0.5 MG/DL — SIGNIFICANT CHANGE UP (ref 0.2–1.2)
BUN SERPL-MCNC: 15 MG/DL — SIGNIFICANT CHANGE UP (ref 7–23)
CALCIUM SERPL-MCNC: 9 MG/DL — SIGNIFICANT CHANGE UP (ref 8.5–10.1)
CHLORIDE SERPL-SCNC: 111 MMOL/L — HIGH (ref 96–108)
CO2 SERPL-SCNC: 24 MMOL/L — SIGNIFICANT CHANGE UP (ref 22–31)
CREAT SERPL-MCNC: 0.76 MG/DL — SIGNIFICANT CHANGE UP (ref 0.5–1.3)
EGFR: 82 ML/MIN/1.73M2 — SIGNIFICANT CHANGE UP
EOSINOPHIL # BLD AUTO: 0.05 K/UL — SIGNIFICANT CHANGE UP (ref 0–0.5)
EOSINOPHIL NFR BLD AUTO: 0.9 % — SIGNIFICANT CHANGE UP (ref 0–6)
GLUCOSE SERPL-MCNC: 112 MG/DL — HIGH (ref 70–99)
HCT VFR BLD CALC: 37.6 % — SIGNIFICANT CHANGE UP (ref 34.5–45)
HGB BLD-MCNC: 12.4 G/DL — SIGNIFICANT CHANGE UP (ref 11.5–15.5)
IMM GRANULOCYTES NFR BLD AUTO: 0.2 % — SIGNIFICANT CHANGE UP (ref 0–0.9)
INR BLD: 0.87 RATIO — SIGNIFICANT CHANGE UP (ref 0.85–1.18)
LYMPHOCYTES # BLD AUTO: 1.35 K/UL — SIGNIFICANT CHANGE UP (ref 1–3.3)
LYMPHOCYTES # BLD AUTO: 23.9 % — SIGNIFICANT CHANGE UP (ref 13–44)
MCHC RBC-ENTMCNC: 31.4 PG — SIGNIFICANT CHANGE UP (ref 27–34)
MCHC RBC-ENTMCNC: 33 GM/DL — SIGNIFICANT CHANGE UP (ref 32–36)
MCV RBC AUTO: 95.2 FL — SIGNIFICANT CHANGE UP (ref 80–100)
MONOCYTES # BLD AUTO: 0.4 K/UL — SIGNIFICANT CHANGE UP (ref 0–0.9)
MONOCYTES NFR BLD AUTO: 7.1 % — SIGNIFICANT CHANGE UP (ref 2–14)
NEUTROPHILS # BLD AUTO: 3.83 K/UL — SIGNIFICANT CHANGE UP (ref 1.8–7.4)
NEUTROPHILS NFR BLD AUTO: 67.5 % — SIGNIFICANT CHANGE UP (ref 43–77)
NRBC # BLD: 0 /100 WBCS — SIGNIFICANT CHANGE UP (ref 0–0)
PLATELET # BLD AUTO: 257 K/UL — SIGNIFICANT CHANGE UP (ref 150–400)
POTASSIUM SERPL-MCNC: 3.6 MMOL/L — SIGNIFICANT CHANGE UP (ref 3.5–5.3)
POTASSIUM SERPL-SCNC: 3.6 MMOL/L — SIGNIFICANT CHANGE UP (ref 3.5–5.3)
PROT SERPL-MCNC: 7.1 G/DL — SIGNIFICANT CHANGE UP (ref 6–8.3)
PROTHROM AB SERPL-ACNC: 10.2 SEC — SIGNIFICANT CHANGE UP (ref 9.5–13)
RBC # BLD: 3.95 M/UL — SIGNIFICANT CHANGE UP (ref 3.8–5.2)
RBC # FLD: 11.7 % — SIGNIFICANT CHANGE UP (ref 10.3–14.5)
SODIUM SERPL-SCNC: 140 MMOL/L — SIGNIFICANT CHANGE UP (ref 135–145)
TROPONIN I, HIGH SENSITIVITY RESULT: 4.2 NG/L — SIGNIFICANT CHANGE UP
WBC # BLD: 5.66 K/UL — SIGNIFICANT CHANGE UP (ref 3.8–10.5)
WBC # FLD AUTO: 5.66 K/UL — SIGNIFICANT CHANGE UP (ref 3.8–10.5)

## 2024-07-20 PROCEDURE — 96375 TX/PRO/DX INJ NEW DRUG ADDON: CPT | Mod: XU

## 2024-07-20 PROCEDURE — 70450 CT HEAD/BRAIN W/O DYE: CPT | Mod: MC

## 2024-07-20 PROCEDURE — 70496 CT ANGIOGRAPHY HEAD: CPT | Mod: 26,MC

## 2024-07-20 PROCEDURE — 80053 COMPREHEN METABOLIC PANEL: CPT

## 2024-07-20 PROCEDURE — 36415 COLL VENOUS BLD VENIPUNCTURE: CPT

## 2024-07-20 PROCEDURE — 70498 CT ANGIOGRAPHY NECK: CPT | Mod: MC

## 2024-07-20 PROCEDURE — 93005 ELECTROCARDIOGRAM TRACING: CPT

## 2024-07-20 PROCEDURE — 84484 ASSAY OF TROPONIN QUANT: CPT

## 2024-07-20 PROCEDURE — 93010 ELECTROCARDIOGRAM REPORT: CPT

## 2024-07-20 PROCEDURE — 99285 EMERGENCY DEPT VISIT HI MDM: CPT

## 2024-07-20 PROCEDURE — 85730 THROMBOPLASTIN TIME PARTIAL: CPT

## 2024-07-20 PROCEDURE — 71045 X-RAY EXAM CHEST 1 VIEW: CPT

## 2024-07-20 PROCEDURE — 70498 CT ANGIOGRAPHY NECK: CPT | Mod: 26,MC

## 2024-07-20 PROCEDURE — 70496 CT ANGIOGRAPHY HEAD: CPT | Mod: MC

## 2024-07-20 PROCEDURE — 85025 COMPLETE CBC W/AUTO DIFF WBC: CPT

## 2024-07-20 PROCEDURE — 99285 EMERGENCY DEPT VISIT HI MDM: CPT | Mod: 25

## 2024-07-20 PROCEDURE — 71045 X-RAY EXAM CHEST 1 VIEW: CPT | Mod: 26

## 2024-07-20 PROCEDURE — 82962 GLUCOSE BLOOD TEST: CPT

## 2024-07-20 PROCEDURE — 85610 PROTHROMBIN TIME: CPT

## 2024-07-20 PROCEDURE — 96374 THER/PROPH/DIAG INJ IV PUSH: CPT | Mod: XU

## 2024-07-20 RX ORDER — CEFPODOXIME PROXETIL 50 MG/5 ML
1 SUSPENSION, RECONSTITUTED, ORAL (ML) ORAL
Qty: 14 | Refills: 0
Start: 2024-07-20 | End: 2024-07-26

## 2024-07-20 RX ORDER — ACETAMINOPHEN 325 MG
1000 TABLET ORAL ONCE
Refills: 0 | Status: COMPLETED | OUTPATIENT
Start: 2024-07-20 | End: 2024-07-20

## 2024-07-20 RX ORDER — AZITHROMYCIN 250 MG/1
1 TABLET, FILM COATED ORAL
Qty: 6 | Refills: 0
Start: 2024-07-20 | End: 2024-07-24

## 2024-07-20 RX ORDER — MECLIZINE HCL 25 MG
25 TABLET ORAL ONCE
Refills: 0 | Status: COMPLETED | OUTPATIENT
Start: 2024-07-20 | End: 2024-07-20

## 2024-07-20 RX ORDER — SODIUM CHLORIDE 0.9 % (FLUSH) 0.9 %
1000 SYRINGE (ML) INJECTION ONCE
Refills: 0 | Status: COMPLETED | OUTPATIENT
Start: 2024-07-20 | End: 2024-07-20

## 2024-07-20 RX ORDER — METOCLOPRAMIDE 5 MG/5ML
10 SOLUTION ORAL ONCE
Refills: 0 | Status: COMPLETED | OUTPATIENT
Start: 2024-07-20 | End: 2024-07-20

## 2024-07-20 RX ADMIN — Medication 400 MILLIGRAM(S): at 11:39

## 2024-07-20 RX ADMIN — Medication 1000 MILLILITER(S): at 11:39

## 2024-07-20 RX ADMIN — Medication 25 MILLIGRAM(S): at 11:44

## 2024-07-20 RX ADMIN — METOCLOPRAMIDE 10 MILLIGRAM(S): 5 SOLUTION ORAL at 11:39

## 2024-07-20 NOTE — ED PROVIDER NOTE - CLINICAL SUMMARY MEDICAL DECISION MAKING FREE TEXT BOX
Here with intermittent dizziness described as spinning.  Differential inclusive of central versus peripheral cause, electrolyte abnormality, dehydration, occult infection.  Check labs, CTs, treat symptoms, reevaluate.  Consider admission for further workup and management.

## 2024-07-20 NOTE — ED PROVIDER NOTE - PROVIDER TOKENS
PROVIDER:[TOKEN:[[id number]],FOLLOWUP:[1-3 Days]],PROVIDER:[TOKEN:[[id number]],FOLLOWUP:[1-3 Days]],PROVIDER:[TOKEN:[[id number]],FOLLOWUP:[1-3 Days]]

## 2024-07-20 NOTE — ED PROVIDER NOTE - OBJECTIVE STATEMENT
73-year-old female with past medical history of hypertension, hyperlipidemia, CAD presents today due to dizziness since yesterday morning.  Patient reports that she woke up with the dizziness.  Patient was last known well on Thursday night but does not recall what time she went to bed.  Patient has a history of vertigo 1 year ago in which they did a workup without acute findings and did not follow-up since.  Patient reports at that time they gave her medications and she got better.  Patient does admit to a mild 5 out of 10 headache and chest discomfort.  Patient does admit to burping.  Patient vomited once this morning.  Patient denies abdominal pain, fever, shortness of breath, visual changes, facial droop, numbness, weakness, blood thinner use, or any other complaints.

## 2024-07-20 NOTE — ED ADULT NURSE NOTE - NS PRO PASSIVE SMOKE EXP
Pt mother calling need to ángel her son for appt for accutane pls call his mother bradford back to discuss @ [phone] thank you
No

## 2024-07-20 NOTE — ED ADULT NURSE NOTE - NSFALLRISKINTERV_ED_ALL_ED

## 2024-07-20 NOTE — ED PROVIDER NOTE - PHYSICAL EXAMINATION
Constitutional: Awake, Alert, non-toxic. NAD  HEAD: Normocephalic, atraumatic.   EYES: PERRL, EOM intact, conjunctiva and sclera are clear bilaterally  ENT: No rhinorrhea, normal pharynx, patent, no tonsillar exudate or enlargement, mucous membranes pink/moist, no erythema, no drooling or stridor.   NECK: Supple, non-tender  CARDIOVASCULAR: Normal S1, S2; regular rate and rhythm.  RESPIRATORY: Normal respiratory effort; breath sounds CTAB, no wheezes, rhonchi, or rales. Speaking in full sentences. No accessory muscle use.   ABDOMEN: Soft; non-tender, non-distended  EXTREMITIES: Full passive and active ROM in all extremities; non-tender to palpation; distal pulses palpable and symmetric, no edema, no crepitus or step off  SKIN: Warm, dry; good skin turgor, no apparent lesions or rashes, no ecchymosis, brisk capillary refill.  NEURO: A&O x3. Sensory and motor functions are grossly intact. Speech is normal. Appearance and judgement seem appropriate for gender and age. No neurological deficits. Neurovascular sensation intact motor function 5/5 of upper and lower extremities, CN II-XII grossly intact, no ataxia, absent pronator drift, intact cerebellar function. Speech clear, without articulation or word-finding difficulties. Eyes- PERRL bilaterally. EOMs in tact. No nystagmus. No facial droop. NIH - 0

## 2024-07-20 NOTE — ED PROVIDER NOTE - ATTENDING APP SHARED VISIT CONTRIBUTION OF CARE
This was a shared visit with RICH. I reviewed and verified the documentation and independently performed the documented MDM.

## 2024-07-20 NOTE — ED PROVIDER NOTE - NSFOLLOWUPINSTRUCTIONS_ED_ALL_ED_FT
Follow up with your PCP, ENT, Neurology, and Pulmonology. Return for dizziness, headache, visual changes, numbness/weakness, chest pain, shortness of breath, worsening condition.     Community Acquired Pneumonia    WHAT YOU NEED TO KNOW:    What is community-acquired pneumonia (CAP)? CAP is a lung infection that you get outside of a hospital or nursing home setting. Your lungs become inflamed and cannot work well. CAP may be caused by bacteria, viruses, or fungi.  The Lungs    What increases my risk for CAP?    Chronic lung disease    Cigarette smoking    Brain disorders such as stroke, dementia, and cerebral palsy    Weakened immune system    Recent surgery or trauma    Surgery for cancer of the mouth, throat, or neck    Medical conditions such as diabetes or heart disease  What are the signs and symptoms of CAP?    Cough that may bring up green, yellow, or bloody mucus    Fever, chills, or severe shaking    Shortness of breath    Breathing and heartbeat that are faster than usual    Pain in your chest or back when you breathe in or cough    Fatigue and loss of appetite    Trouble thinking clearly  How is CAP diagnosed? Your healthcare provider will listen to your lungs. You may need a chest x-ray. You may also need any of the following if you are admitted to the hospital:    CT scan pictures may show a lung infection or other problems, such as fluid around your lungs. You may be given contrast liquid to help your lungs show up better in the pictures. Tell the healthcare provider if you have ever had an allergic reaction to contrast liquid.    A pulse oximeter is a device that measures the amount of oxygen in your blood.  Pulse Oximeter      Blood and sputum tests may be done to check for the germ causing your infection.    Bronchoscopy is a procedure to look inside your airway and learn the cause of your airway or lung condition. A bronchoscope (thin tube with a light) is inserted into your mouth and moved down your throat to your airway. Tissue and fluid may be collected from your airway or lungs to be tested.    Nucleic acid-based testing , also called a PCR test, may be used to check for a virus causing your pneumonia. You may need the test if you have severe CAP or a weakened immune system.  How is CAP treated? Treatment will depend on the type of germ causing your CAP, and how bad your symptoms are. You may need antibiotics for at least 5 days if your pneumonia is caused by bacteria. Antiviral medicines may be given if you have viral pneumonia. You may need medicines that dilate your bronchial tubes. You may need oxygen if your blood oxygen level is lower than it should be. You may need to be admitted to the hospital if your pneumonia is severe.    What can I do to manage CAP?    Get plenty of rest. Rest helps your body heal.    Do not smoke or allow others to smoke around you. Nicotine and other chemicals in cigarettes and cigars can cause lung damage. Ask your healthcare provider for information if you currently smoke and need help to quit. E-cigarettes or smokeless tobacco still contain nicotine. Talk to your healthcare provider before you use these products.    Breathe warm, moist air. This helps loosen mucus. Loosely place a warm, wet washcloth over your nose and mouth. A room humidifier may also help make the air moist.    Gently tap your chest. This helps loosen mucus so it is easier to cough up.    Take deep breaths and cough. Deep breathing helps open the air passages in your lungs. Coughing helps bring up mucus from your lungs. Take a deep breath and hold the breath as long as you can. Then push the air out of your lungs with a deep, strong cough. Spit out any mucus you have coughed up. Take 10 deep breaths in a row every hour that you are awake. Remember to follow each deep breath with a cough.    Drink liquids as directed. Ask your healthcare provider how much liquid to drink each day and which liquids to drink. Liquids help make mucus thin and easier to get out of your body.  How can I prevent CAP?    Wash your hands often. Use soap for at least 20 seconds. Rinse with warm running water. Dry your hands with a clean towel or paper towel. Use hand  that contains alcohol if soap and water are not available. Wash your hands several times each day. Wash after you use the bathroom, change a child's diaper, and before you prepare or eat food. Do not touch your eyes, nose, or mouth without washing your hands first.  Handwashing      Cover a sneeze or cough. Use a tissue that covers your mouth and nose. Throw the tissue away in a trash can right away. Use the bend of your arm if a tissue is not available. Wash your hands well with soap and water or use a hand . Do not stand close to anyone who is sneezing or coughing.    Clean surfaces often. Clean doorknobs, countertops, cell phones, and other surfaces that are touched often. Use a disinfecting wipe, a single-use sponge, or a cloth you can wash and reuse. Use disinfecting  if you do not have wipes. You can create a disinfecting  by mixing 1 part bleach with 10 parts water.    Try to avoid people who have a cold or the flu. If you are sick, stay away from others as much as possible.    Ask about vaccines you may need. A pneumonia vaccine can help lower your risk for pneumonia. The vaccine may be recommended every 5 years, starting at age 65. Vaccines help lower the risk for infections that can become serious for a person who has pneumonia. Get a flu vaccine each year as soon as recommended, usually in September or October. Get a COVID-19 vaccine and booster as directed. Your healthcare provider can tell you if you should also get other vaccines, and when to get them.    When should I seek immediate care?    You are confused and cannot think clearly.    You have increased trouble breathing.    Your lips or fingernails turn gray or blue.  When should I call my doctor?    Your symptoms do not get better, or get worse.    You are urinating less, or not at all.    You have questions or concerns about your condition or care.  CARE AGREEMENT:    You have the right to help plan your care. Learn about your health condition and how it may be treated. Discuss treatment options with your healthcare providers to decide what care you want to receive. You always have the right to refuse treatment.    © [employer] 1973, 2024    	  back to top            © [employer] 1973, 2024

## 2024-07-20 NOTE — ED PROVIDER NOTE - PROGRESS NOTE DETAILS
pt offered admission but declined. Requesting discharge. Admits to recent cough. Rxed for CAP.  - [id number]. All questions were answered. Discussed the importance of prompt, close medical follow-up. Patient will return with any changes, concerns or persistent/worsening symptoms.  Patient verbalized understanding.

## 2024-07-20 NOTE — ED ADULT NURSE NOTE - OBJECTIVE STATEMENT
pt A&Ox4 from triage with complaints of dizziness since yesterday morning. denies headache/weakness. accompanied nausea/vomiting. has a hx of Vertigo. denies taking any medication today for dizziness. c/o intermittent epigastric pain for months. NSR on bedside cardiac monitor. moving all extremities equally, speech clear and logical, no facial droop/arm drift noted

## 2024-07-20 NOTE — ED PROVIDER NOTE - PATIENT PORTAL LINK FT
You can access the FollowMyHealth Patient Portal offered by [facility] by registering at the following website: [email or web address]. By joining [employer]’s FollowMyHealth portal, you will also be able to view your health information using other applications (apps) compatible with our system.

## 2024-07-20 NOTE — ED PROVIDER NOTE - CARE PROVIDERS DIRECT ADDRESSES
,DirectAddress_Unknown,[email or web address]

## 2024-07-20 NOTE — ED PROVIDER NOTE - CARE PROVIDER_API CALL
[place]  Neurology  [address] [place] [address]  Phone: [phone]  Fax: [phone]  Follow Up Time: 1-3 Days    Matthew Hess  Pulmonary Disease  [address] [place] [id number]  Phone: [phone]  Fax: [phone]  Follow Up Time: 1-3 Days    Glenroy Loo  Otolaryngology  [address] [facility]  [place] [address]  Phone: [phone]  Fax: [phone]  Follow Up Time: 1-3 Days

## 2025-01-03 ENCOUNTER — EMERGENCY (EMERGENCY)
Facility: HOSPITAL | Age: 75
LOS: 1 days | Discharge: HOME | End: 2025-01-03
Attending: STUDENT IN AN ORGANIZED HEALTH CARE EDUCATION/TRAINING PROGRAM | Admitting: STUDENT IN AN ORGANIZED HEALTH CARE EDUCATION/TRAINING PROGRAM
Payer: MEDICARE

## 2025-01-03 VITALS
OXYGEN SATURATION: 96 % | RESPIRATION RATE: 18 BRPM | HEIGHT: 62 IN | DIASTOLIC BLOOD PRESSURE: 73 MMHG | TEMPERATURE: 98 F | SYSTOLIC BLOOD PRESSURE: 162 MMHG | WEIGHT: 130.07 LBS | HEART RATE: 83 BPM

## 2025-01-03 DIAGNOSIS — Z98.890 OTHER SPECIFIED POSTPROCEDURAL STATES: Chronic | ICD-10-CM

## 2025-01-03 LAB
ALBUMIN SERPL ELPH-MCNC: 3.2 G/DL — LOW (ref 3.3–5)
ALP SERPL-CCNC: 77 U/L — SIGNIFICANT CHANGE UP (ref 40–120)
ALT FLD-CCNC: 39 U/L — SIGNIFICANT CHANGE UP (ref 12–78)
ANION GAP SERPL CALC-SCNC: 4 MMOL/L — LOW (ref 5–17)
AST SERPL-CCNC: 32 U/L — SIGNIFICANT CHANGE UP (ref 15–37)
BASOPHILS # BLD AUTO: 0.02 K/UL — SIGNIFICANT CHANGE UP (ref 0–0.2)
BASOPHILS NFR BLD AUTO: 0.6 % — SIGNIFICANT CHANGE UP (ref 0–2)
BILIRUB SERPL-MCNC: 0.3 MG/DL — SIGNIFICANT CHANGE UP (ref 0.2–1.2)
BUN SERPL-MCNC: 18 MG/DL — SIGNIFICANT CHANGE UP (ref 7–23)
CALCIUM SERPL-MCNC: 8.5 MG/DL — SIGNIFICANT CHANGE UP (ref 8.5–10.1)
CHLORIDE SERPL-SCNC: 111 MMOL/L — HIGH (ref 96–108)
CO2 SERPL-SCNC: 27 MMOL/L — SIGNIFICANT CHANGE UP (ref 22–31)
CREAT SERPL-MCNC: 0.71 MG/DL — SIGNIFICANT CHANGE UP (ref 0.5–1.3)
EGFR: 88 ML/MIN/1.73M2 — SIGNIFICANT CHANGE UP
EOSINOPHIL # BLD AUTO: 0.06 K/UL — SIGNIFICANT CHANGE UP (ref 0–0.5)
EOSINOPHIL NFR BLD AUTO: 1.7 % — SIGNIFICANT CHANGE UP (ref 0–6)
FLUAV AG NPH QL: DETECTED
FLUBV AG NPH QL: SIGNIFICANT CHANGE UP
GLUCOSE SERPL-MCNC: 121 MG/DL — HIGH (ref 70–99)
HCT VFR BLD CALC: 34.7 % — SIGNIFICANT CHANGE UP (ref 34.5–45)
HGB BLD-MCNC: 11.7 G/DL — SIGNIFICANT CHANGE UP (ref 11.5–15.5)
IMM GRANULOCYTES NFR BLD AUTO: 0.3 % — SIGNIFICANT CHANGE UP (ref 0–0.9)
LYMPHOCYTES # BLD AUTO: 1.2 K/UL — SIGNIFICANT CHANGE UP (ref 1–3.3)
LYMPHOCYTES # BLD AUTO: 33.2 % — SIGNIFICANT CHANGE UP (ref 13–44)
MCHC RBC-ENTMCNC: 31.5 PG — SIGNIFICANT CHANGE UP (ref 27–34)
MCHC RBC-ENTMCNC: 33.7 G/DL — SIGNIFICANT CHANGE UP (ref 32–36)
MCV RBC AUTO: 93.3 FL — SIGNIFICANT CHANGE UP (ref 80–100)
MONOCYTES # BLD AUTO: 0.38 K/UL — SIGNIFICANT CHANGE UP (ref 0–0.9)
MONOCYTES NFR BLD AUTO: 10.5 % — SIGNIFICANT CHANGE UP (ref 2–14)
NEUTROPHILS # BLD AUTO: 1.94 K/UL — SIGNIFICANT CHANGE UP (ref 1.8–7.4)
NEUTROPHILS NFR BLD AUTO: 53.7 % — SIGNIFICANT CHANGE UP (ref 43–77)
NRBC # BLD: 0 /100 WBCS — SIGNIFICANT CHANGE UP (ref 0–0)
PLATELET # BLD AUTO: 186 K/UL — SIGNIFICANT CHANGE UP (ref 150–400)
POTASSIUM SERPL-MCNC: 3 MMOL/L — LOW (ref 3.5–5.3)
POTASSIUM SERPL-SCNC: 3 MMOL/L — LOW (ref 3.5–5.3)
PROT SERPL-MCNC: 6.6 G/DL — SIGNIFICANT CHANGE UP (ref 6–8.3)
RBC # BLD: 3.72 M/UL — LOW (ref 3.8–5.2)
RBC # FLD: 12.1 % — SIGNIFICANT CHANGE UP (ref 10.3–14.5)
RSV RNA NPH QL NAA+NON-PROBE: SIGNIFICANT CHANGE UP
SARS-COV-2 RNA SPEC QL NAA+PROBE: SIGNIFICANT CHANGE UP
SODIUM SERPL-SCNC: 142 MMOL/L — SIGNIFICANT CHANGE UP (ref 135–145)
WBC # BLD: 3.61 K/UL — LOW (ref 3.8–10.5)
WBC # FLD AUTO: 3.61 K/UL — LOW (ref 3.8–10.5)

## 2025-01-03 PROCEDURE — 93010 ELECTROCARDIOGRAM REPORT: CPT

## 2025-01-03 PROCEDURE — 71045 X-RAY EXAM CHEST 1 VIEW: CPT | Mod: 26

## 2025-01-03 PROCEDURE — 99285 EMERGENCY DEPT VISIT HI MDM: CPT

## 2025-01-03 RX ORDER — ALBUTEROL SULFATE 90 UG/1
2 INHALANT RESPIRATORY (INHALATION)
Qty: 1 | Refills: 0
Start: 2025-01-03 | End: 2025-02-01

## 2025-01-03 RX ORDER — OSELTAMIVIR 75 MG/1
75 CAPSULE ORAL ONCE
Refills: 0 | Status: COMPLETED | OUTPATIENT
Start: 2025-01-03 | End: 2025-01-03

## 2025-01-03 RX ORDER — IPRATROPIUM BROMIDE AND ALBUTEROL SULFATE .5; 2.5 MG/3ML; MG/3ML
3 SOLUTION RESPIRATORY (INHALATION)
Refills: 0 | Status: COMPLETED | OUTPATIENT
Start: 2025-01-03 | End: 2025-01-03

## 2025-01-03 RX ORDER — POTASSIUM CHLORIDE 600 MG/1
40 TABLET, FILM COATED, EXTENDED RELEASE ORAL ONCE
Refills: 0 | Status: COMPLETED | OUTPATIENT
Start: 2025-01-03 | End: 2025-01-03

## 2025-01-03 RX ORDER — OSELTAMIVIR 75 MG/1
1 CAPSULE ORAL
Qty: 10 | Refills: 0
Start: 2025-01-03 | End: 2025-01-07

## 2025-01-03 RX ORDER — PREDNISONE 5 MG
1 TABLET ORAL
Qty: 5 | Refills: 0
Start: 2025-01-03 | End: 2025-01-07

## 2025-01-03 RX ORDER — METHYLPREDNISOLONE 4 MG/1
125 TABLET ORAL ONCE
Refills: 0 | Status: COMPLETED | OUTPATIENT
Start: 2025-01-03 | End: 2025-01-03

## 2025-01-03 RX ADMIN — IPRATROPIUM BROMIDE AND ALBUTEROL SULFATE 3 MILLILITER(S): .5; 2.5 SOLUTION RESPIRATORY (INHALATION) at 21:30

## 2025-01-03 RX ADMIN — IPRATROPIUM BROMIDE AND ALBUTEROL SULFATE 3 MILLILITER(S): .5; 2.5 SOLUTION RESPIRATORY (INHALATION) at 21:53

## 2025-01-03 RX ADMIN — METHYLPREDNISOLONE 125 MILLIGRAM(S): 4 TABLET ORAL at 21:30

## 2025-01-03 RX ADMIN — IPRATROPIUM BROMIDE AND ALBUTEROL SULFATE 3 MILLILITER(S): .5; 2.5 SOLUTION RESPIRATORY (INHALATION) at 21:55

## 2025-01-03 NOTE — ED PROVIDER NOTE - PROGRESS NOTE DETAILS
Patient feels better.  Results discussed with patient and family member at bedside.  Will discharge with Tamiflu and steroids.

## 2025-01-03 NOTE — ED ADULT TRIAGE NOTE - RESPIRATORY RATE (BREATHS/MIN)
Leopoldo Southward is a 22 y.o. female here for referral     Leopoldo Southward is a 22 y.o. female (: 1998) presenting to address:    Chief Complaint   Patient presents with    Acne     pt states she would like a Derm referral for acne        Vitals:    17 1047   BP: 104/66   Pulse: (!) 109   Resp: 18   SpO2: 98%   Weight: 139 lb 3.2 oz (63.1 kg)   Height: 5' 5\" (1.651 m)   PainSc:   0 - No pain       Hearing/Vision:   No exam data present    Learning Assessment:     Learning Assessment 2017   PRIMARY LEARNER Patient   HIGHEST LEVEL OF EDUCATION - PRIMARY LEARNER  SOME COLLEGE   BARRIERS PRIMARY LEARNER NONE     NONE   PRIMARY LANGUAGE ENGLISH   LEARNER PREFERENCE PRIMARY READING   ANSWERED BY patient   RELATIONSHIP SELF     Depression Screening:     PHQ over the last two weeks 2017   Little interest or pleasure in doing things Not at all   Feeling down, depressed or hopeless Not at all   Total Score PHQ 2 0     Fall Risk Assessment:   No flowsheet data found. Abuse Screening:   No flowsheet data found. Coordination of Care Questionaire:   1. Have you been to the ER, urgent care clinic since your last visit? Hospitalized since your last visit? NO    2. Have you seen or consulted any other health care providers outside of the [address] [place] [address] since your last visit? Include any pap smears or colon screening. NO    Advanced Directive:   1. Do you have an Advanced Directive? NO    2. Would you like information on Advanced Directives?  NO 18

## 2025-01-03 NOTE — ED PROVIDER NOTE - PATIENT PORTAL LINK FT
You can access the FollowMyHealth Patient Portal offered by [facility] by registering at the following website: [email or web address]. By joining [employer]’s FollowMyHealth portal, you will also be able to view your health information using other applications (apps) compatible with our system.

## 2025-01-03 NOTE — ED PROVIDER NOTE - NSFOLLOWUPINSTRUCTIONS_ED_ALL_ED_FT
Please follow up with your Primary Care Physician and any specialists as discussed.  Please take your medications as prescribed and or instructed.  If your symptoms persist or worsen, please seek care. Either return to the Emergency Department, go to urgent care or see your primary care doctor.  Please refer to general information and instructions attached or below:       Influenza, Adult      Influenza, also called "the flu," is a viral infection that mainly affects the respiratory tract. This includes the lungs, nose, and throat. The flu spreads easily from person to person (is contagious). It causes common cold symptoms, along with high fever and body aches.      What are the causes?    This condition is caused by the influenza virus. You can get the virus by:  •Breathing in droplets that are in the air from an infected person's cough or sneeze.      •Touching something that has the virus on it (has been contaminated) and then touching your mouth, nose, or eyes.        What increases the risk?    The following factors may make you more likely to get the flu:  •Not washing or sanitizing your hands often.      •Having close contact with many people during cold and flu season.      •Touching your mouth, eyes, or nose without first washing or sanitizing your hands.      •Not getting an annual flu shot.      You may have a higher risk for the flu, including serious problems, such as a lung infection (pneumonia), if you:  •Are older than 65.      •Are pregnant.      •Have a weakened disease-fighting system (immune system). This includes people who have HIV or AIDS, are on chemotherapy, or are taking medicines that reduce (suppress) the immune system.      •Have a long-term (chronic) illness, such as heart disease, kidney disease, diabetes, or lung disease.      •Have a liver disorder.      •Are severely overweight (morbidly obese).      •Have anemia.      •Have asthma.        What are the signs or symptoms?    Symptoms of this condition usually begin suddenly and last 4–14 days. These may include:  •Fever and chills.      •Headaches, body aches, or muscle aches.      •Sore throat.      •Cough.      •Runny or stuffy (congested) nose.      •Chest discomfort.      •Poor appetite.      •Weakness or fatigue.      •Dizziness.      •Nausea or vomiting.        How is this diagnosed?    This condition may be diagnosed based on:  •Your symptoms and medical history.      •A physical exam.       •Swabbing your nose or throat and testing the fluid for the influenza virus.        How is this treated?    If the flu is diagnosed early, you can be treated with antiviral medicine that is given by mouth (orally) or through an IV. This can help reduce how severe the illness is and how long it lasts.    Taking care of yourself at home can help relieve symptoms. Your health care provider may recommend:  •Taking over-the-counter medicines.      •Drinking plenty of fluids.      In many cases, the flu goes away on its own. If you have severe symptoms or complications, you may be treated in a hospital.      Follow these instructions at home:    Activity     •Rest as needed and get plenty of sleep.      •Stay home from work or school as told by your health care provider. Unless you are visiting your health care provider, avoid leaving home until your fever has been gone for 24 hours without taking medicine.      Eating and drinking     •Take an oral rehydration solution (ORS). This is a drink that is sold at pharmacies and retail stores.      •Drink enough fluid to keep your urine pale yellow.      •Drink clear fluids in small amounts as you are able. Clear fluids include water, ice chips, fruit juice mixed with water, and low-calorie sports drinks.      •Eat bland, easy-to-digest foods in small amounts as you are able. These foods include bananas, applesauce, rice, lean meats, toast, and crackers.      •Avoid drinking fluids that contain a lot of sugar or caffeine, such as energy drinks, regular sports drinks, and soda.      •Avoid alcohol.      •Avoid spicy or fatty foods.        General instructions                   •Take over-the-counter and prescription medicines only as told by your health care provider.    •Use a cool mist humidifier to add humidity to the air in your home. This can make it easier to breathe.  •When using a cool mist humidifier, clean it daily. Empty the water and replace it with clean water.        •Cover your mouth and nose when you cough or sneeze.      •Wash your hands with soap and water often and for at least 20 seconds, especially after you cough or sneeze. If soap and water are not available, use alcohol-based hand .      •Keep all follow-up visits. This is important.        How is this prevented?     •Get an annual flu shot. This is usually available in late summer, fall, or winter. Ask your health care provider when you should get your flu shot.      •Avoid contact with people who are sick during cold and flu season. This is generally fall and winter.        Contact a health care provider if:    •You develop new symptoms.    •You have:  •Chest pain.      •Diarrhea.      •A fever.        •Your cough gets worse.      •You produce more mucus.      •You feel nauseous or you vomit.        Get help right away if you:    •Develop shortness of breath or have difficulty breathing.      •Have skin or nails that turn a bluish color.      •Have severe pain or stiffness in your neck.      •Develop a sudden headache or sudden pain in your face or ear.      •Cannot eat or drink without vomiting.      These symptoms may represent a serious problem that is an emergency. Do not wait to see if the symptoms will go away. Get medical help right away. Call your local emergency services (911 in the [place] Do not drive yourself to the hospital.       Summary    •Influenza, also called "the flu," is a viral infection that primarily affects your respiratory tract.      •Symptoms of the flu usually begin suddenly and last 4–14 days.      •Getting an annual flu shot is the best way to prevent getting the flu.      •Stay home from work or school as told by your health care provider. Unless you are visiting your health care provider, avoid leaving home until your fever has been gone for 24 hours without taking medicine.      •Keep all follow-up visits. This is important.      This information is not intended to replace advice given to you by your health care provider. Make sure you discuss any questions you have with your health care provider.

## 2025-01-03 NOTE — ED PROVIDER NOTE - ATTENDING APP SHARED VISIT CONTRIBUTION OF CARE
This was a shared visit with RICH. I reviewed and verified the documentation and independently performed the documented MDM. Patient seen and examined by myself.

## 2025-01-03 NOTE — ED PROVIDER NOTE - OBJECTIVE STATEMENT
Patient is a 74-year-old female past medical history of asthma hypertension hyperlipidemia coming in complaining of cough for last 3 days.  Patient complaining of rib chest pain with coughing.  Patient denies any abdominal pain nausea vomiting diarrhea patient is been taking over-the-counter cough medication without relief denies any sick contacts.  Patient denies any leg swelling recent travels.

## 2025-01-03 NOTE — ED ADULT NURSE NOTE - OBJECTIVE STATEMENT
pt a/o x 4 with a calm affect c/o cough and chest tightness with audible wheeze.  patient reports rib pain following cough.  pending treatment and re-eval

## 2025-01-03 NOTE — ED PROVIDER NOTE - CLINICAL SUMMARY MEDICAL DECISION MAKING FREE TEXT BOX
here with cough, congestion, fever. differential diagnosis inclusive of PNA, viral infection, bronchospasm. check labs, viral swab, xray, treat symptoms, re-eval.

## 2025-01-04 VITALS
HEART RATE: 78 BPM | OXYGEN SATURATION: 98 % | DIASTOLIC BLOOD PRESSURE: 74 MMHG | RESPIRATION RATE: 18 BRPM | TEMPERATURE: 98 F | SYSTOLIC BLOOD PRESSURE: 134 MMHG

## 2025-01-04 PROCEDURE — 99285 EMERGENCY DEPT VISIT HI MDM: CPT | Mod: 25

## 2025-01-04 PROCEDURE — 85025 COMPLETE CBC W/AUTO DIFF WBC: CPT

## 2025-01-04 PROCEDURE — 94640 AIRWAY INHALATION TREATMENT: CPT

## 2025-01-04 PROCEDURE — 71045 X-RAY EXAM CHEST 1 VIEW: CPT

## 2025-01-04 PROCEDURE — 80053 COMPREHEN METABOLIC PANEL: CPT

## 2025-01-04 PROCEDURE — 96374 THER/PROPH/DIAG INJ IV PUSH: CPT

## 2025-01-04 PROCEDURE — 87637 SARSCOV2&INF A&B&RSV AMP PRB: CPT

## 2025-01-04 PROCEDURE — 93005 ELECTROCARDIOGRAM TRACING: CPT

## 2025-01-04 PROCEDURE — 0241U: CPT

## 2025-01-04 PROCEDURE — 36415 COLL VENOUS BLD VENIPUNCTURE: CPT

## 2025-01-04 RX ADMIN — OSELTAMIVIR 75 MILLIGRAM(S): 75 CAPSULE ORAL at 00:09

## 2025-01-04 RX ADMIN — POTASSIUM CHLORIDE 40 MILLIEQUIVALENT(S): 600 TABLET, FILM COATED, EXTENDED RELEASE ORAL at 00:09

## 2025-04-07 NOTE — ED PROVIDER NOTE - CARDIAC HEART SOUNDS
[facility] Patient has been discharged home from the ED. The provider did not prescribe another course of oral steroids.    Outgoing call to patient's mother to relay provider instructions. No answer, voicemail left. Message also sent via patient portal.    S1-S2

## 2025-04-09 ENCOUNTER — OUTPATIENT (OUTPATIENT)
Dept: OUTPATIENT SERVICES | Facility: HOSPITAL | Age: 75
LOS: 1 days | End: 2025-04-09
Payer: MEDICARE

## 2025-04-09 ENCOUNTER — APPOINTMENT (OUTPATIENT)
Dept: MRI IMAGING | Facility: IMAGING CENTER | Age: 75
End: 2025-04-09
Payer: MEDICARE

## 2025-04-09 DIAGNOSIS — R51.9 HEADACHE, UNSPECIFIED: ICD-10-CM

## 2025-04-09 DIAGNOSIS — Z00.8 ENCOUNTER FOR OTHER GENERAL EXAMINATION: ICD-10-CM

## 2025-04-09 DIAGNOSIS — Z98.890 OTHER SPECIFIED POSTPROCEDURAL STATES: Chronic | ICD-10-CM

## 2025-04-09 PROCEDURE — 70551 MRI BRAIN STEM W/O DYE: CPT | Mod: 26

## 2025-04-09 PROCEDURE — 70551 MRI BRAIN STEM W/O DYE: CPT

## 2025-05-01 ENCOUNTER — OUTPATIENT (OUTPATIENT)
Dept: OUTPATIENT SERVICES | Facility: HOSPITAL | Age: 75
LOS: 1 days | End: 2025-05-01
Payer: MEDICARE

## 2025-05-01 ENCOUNTER — APPOINTMENT (OUTPATIENT)
Dept: MRI IMAGING | Facility: IMAGING CENTER | Age: 75
End: 2025-05-01
Payer: MEDICARE

## 2025-05-01 DIAGNOSIS — Z00.8 ENCOUNTER FOR OTHER GENERAL EXAMINATION: ICD-10-CM

## 2025-05-01 DIAGNOSIS — Z98.890 OTHER SPECIFIED POSTPROCEDURAL STATES: Chronic | ICD-10-CM

## 2025-05-01 PROCEDURE — 72141 MRI NECK SPINE W/O DYE: CPT | Mod: 26

## 2025-05-01 PROCEDURE — 72141 MRI NECK SPINE W/O DYE: CPT
